# Patient Record
Sex: FEMALE | Race: WHITE
[De-identification: names, ages, dates, MRNs, and addresses within clinical notes are randomized per-mention and may not be internally consistent; named-entity substitution may affect disease eponyms.]

---

## 2018-03-12 ENCOUNTER — HOSPITAL ENCOUNTER (OUTPATIENT)
Dept: HOSPITAL 84 - D.OPS | Age: 73
Discharge: HOME | End: 2018-03-12
Attending: INTERNAL MEDICINE
Payer: MEDICARE

## 2018-03-12 VITALS
HEIGHT: 61 IN | BODY MASS INDEX: 37.84 KG/M2 | WEIGHT: 200.42 LBS | BODY MASS INDEX: 37.84 KG/M2 | HEIGHT: 61 IN | WEIGHT: 200.42 LBS

## 2018-03-12 VITALS — DIASTOLIC BLOOD PRESSURE: 95 MMHG | SYSTOLIC BLOOD PRESSURE: 139 MMHG

## 2018-03-12 DIAGNOSIS — R11.0: ICD-10-CM

## 2018-03-12 DIAGNOSIS — G47.30: ICD-10-CM

## 2018-03-12 DIAGNOSIS — R10.13: Primary | ICD-10-CM

## 2018-03-12 DIAGNOSIS — K31.7: ICD-10-CM

## 2018-03-12 DIAGNOSIS — K21.0: ICD-10-CM

## 2018-03-12 DIAGNOSIS — Z01.812: ICD-10-CM

## 2018-03-12 DIAGNOSIS — I10: ICD-10-CM

## 2018-03-12 LAB
ERYTHROCYTE [DISTWIDTH] IN BLOOD BY AUTOMATED COUNT: 16.4 % (ref 11.5–14.5)
HCT VFR BLD CALC: 37.4 % (ref 36–48)
HGB BLD-MCNC: 11 G/DL (ref 12–16)
MCH RBC QN AUTO: 24 PG (ref 26–34)
MCHC RBC AUTO-ENTMCNC: 29.4 G/DL (ref 31–37)
MCV RBC: 81.5 FL (ref 80–100)
PLATELET # BLD: 214 10X3/UL (ref 130–400)
PMV BLD AUTO: 8.9 FL (ref 7.4–10.4)
RBC # BLD AUTO: 4.59 10X6/UL (ref 4–5.4)
WBC # BLD AUTO: 8.1 10X3/UL (ref 4.8–10.8)

## 2018-03-19 ENCOUNTER — HOSPITAL ENCOUNTER (OUTPATIENT)
Dept: HOSPITAL 84 - D.NM | Age: 73
Discharge: HOME | End: 2018-03-19
Attending: INTERNAL MEDICINE
Payer: MEDICARE

## 2018-03-19 VITALS — BODY MASS INDEX: 37.8 KG/M2

## 2018-03-19 DIAGNOSIS — R11.0: ICD-10-CM

## 2018-03-19 DIAGNOSIS — R10.13: Primary | ICD-10-CM

## 2019-02-18 ENCOUNTER — HOSPITAL ENCOUNTER (OUTPATIENT)
Dept: HOSPITAL 84 - D.OPS | Age: 74
Discharge: HOME | End: 2019-02-18
Attending: INTERNAL MEDICINE
Payer: MEDICARE

## 2019-02-18 VITALS — WEIGHT: 185 LBS | BODY MASS INDEX: 34.93 KG/M2 | HEIGHT: 61 IN

## 2019-02-18 VITALS — DIASTOLIC BLOOD PRESSURE: 57 MMHG | SYSTOLIC BLOOD PRESSURE: 122 MMHG

## 2019-02-18 DIAGNOSIS — K57.30: Primary | ICD-10-CM

## 2019-02-18 DIAGNOSIS — Z01.812: ICD-10-CM

## 2019-02-18 DIAGNOSIS — D12.5: ICD-10-CM

## 2019-02-18 DIAGNOSIS — D12.4: ICD-10-CM

## 2019-02-18 DIAGNOSIS — Z85.79: ICD-10-CM

## 2019-02-18 DIAGNOSIS — D12.3: ICD-10-CM

## 2019-02-18 LAB
ERYTHROCYTE [DISTWIDTH] IN BLOOD BY AUTOMATED COUNT: 13.2 % (ref 11.5–14.5)
HCT VFR BLD CALC: 42.1 % (ref 36–48)
HGB BLD-MCNC: 13.7 G/DL (ref 12–16)
MCH RBC QN AUTO: 28.5 PG (ref 26–34)
MCHC RBC AUTO-ENTMCNC: 32.5 G/DL (ref 31–37)
MCV RBC: 87.7 FL (ref 80–100)
PLATELET # BLD: 187 10X3/UL (ref 130–400)
PMV BLD AUTO: 9.6 FL (ref 7.4–10.4)
RBC # BLD AUTO: 4.8 10X6/UL (ref 4–5.4)
WBC # BLD AUTO: 8.6 10X3/UL (ref 4.8–10.8)

## 2019-02-18 NOTE — NUR
1025-RECD FROM GI LAB. ALERT.  VOCARTER IN TO REPORT FINDINGS.
1035-FULL LIQUIDS SERVED, ONLY WANTS SODA/JUICE.

## 2019-02-20 NOTE — OP
PATIENT NAME:  SADIE VELASQUEZ                           MEDICAL RECORD: [id number]
:47                                             LOCATION:D.OPS          
                                                         ADMISSION DATE:        
SURGEON:  CHAVO HUBBARD DO             
 
 
DATE OF OPERATION:  2019
 
PROCEDURE:  Colonoscopy with polypectomy.
 
INDICATIONS FOR PROCEDURE:  History of MALT lymphoma, generalized abdominal
pain, nausea and vomiting, chronic constipation.
 
SCOPE:  Olympus video pediatric colonoscope.
 
MEDICATIONS:  Propofol 430 mg IV per anesthesia.
 
WITHDRAWAL TIME:  26 minutes.
 
ESTIMATED BLOOD LOSS:  Minimal.
 
COMPLICATIONS:  None.
 
FINDINGS:  Informed consent was given.  The patient was made comfortable with
the above medication.  After reaching an adequate level of sedation by slow IV
push, the patient was placed on her left side.  A digital rectal examination was
performed and revealed some external hemorrhoids.  The endoscope was then
advanced under direct visualization through the rectum to the cecum and terminal
ileum.  The endoscope was slowly withdrawn and the mucosa was carefully
examined.  The prep quality was fair.  There were multiple polyps visualized on
today's examination.  Three of these polyps were located in the transverse
colon.  They were benign appearing and sessile and ranged in size from 3-8 mm in
diameter.  Two of these were removed using a hot snare and the third polyp was
removed using hot forceps.  In the descending colon, there was a single benign
appearing sessile polyp, which measured approximately 4 mm in diameter.  It was
removed using hot forceps.  In the sigmoid colon, there was a single polyp,
which was benign appearing and sessile and measured approximately 8 mm in
diameter.  It was removed using a hot snare.  There was evidence of moderate
diverticulosis involving the sigmoid colon.  Retroflexion was performed in the
rectum with a normal appearing rectal wall.  The endoscope was withdrawn from
the patient.  The patient tolerated the procedure well and there were no
complications.
 
IMPRESSION:
1.  Multiple polyps as described above, removed using a combination of hot
forceps and a hot snare.
2.  Moderate diverticulosis of the sigmoid colon.
 
PLAN AND RECOMMENDATIONS:
1.  Discharge home when recovery parameters are met.
2.  Follow up biopsy specimen results.
3.  High fiber diet.
4.  Consider supplementing diet with 1 tablespoon of Metamucil daily.
5.  Continue current medications.
6.  Proceed with upper endoscopy as scheduled for generalized abdominal pain,
nausea and vomiting, and history of MALT lymphoma.
7.  We will follow up in GI clinic after upper endoscopy is completed for
further workup of symptoms.
 
 
 
OPERATIVE REPORT                               [id number]    SADIE VELASQUEZ         
 
 
8.  If bowel movements do not improve with supplementation of fiber, consider
trial of Linzess 145 mcg daily.
 
TRANSINT:[id number] Voice Confirmation ID: [id number] DOCUMENT ID: [id number]
                                           
                                           CHAVO HUBBARD DO             
 
 
 
Electronically Signed by CHAVO ALEMAN on 19 at 0941
 
 
 
 
 
 
 
 
 
 
 
 
 
 
 
 
 
 
 
 
 
 
 
 
 
 
 
 
 
 
 
 
 
 
 
 
 
CC:                                                             [id number]
DICTATION DATE: 19 1015     :     19 1026      [facility] [place] 
                                                                      19
[address] [address]

## 2019-03-04 ENCOUNTER — HOSPITAL ENCOUNTER (OUTPATIENT)
Dept: HOSPITAL 84 - D.OPS | Age: 74
Discharge: HOME | End: 2019-03-04
Attending: INTERNAL MEDICINE
Payer: MEDICARE

## 2019-03-04 VITALS — HEIGHT: 61 IN | WEIGHT: 185.39 LBS | BODY MASS INDEX: 35 KG/M2 | BODY MASS INDEX: 35 KG/M2

## 2019-03-04 DIAGNOSIS — K31.7: ICD-10-CM

## 2019-03-04 DIAGNOSIS — K21.0: ICD-10-CM

## 2019-03-04 DIAGNOSIS — K29.50: Primary | ICD-10-CM

## 2019-03-04 DIAGNOSIS — Z01.812: ICD-10-CM

## 2019-03-04 DIAGNOSIS — Z85.79: ICD-10-CM

## 2019-03-04 LAB
ANION GAP SERPL CALC-SCNC: 13.5 MMOL/L (ref 8–16)
BASOPHILS NFR BLD AUTO: 0.6 % (ref 0–2)
BUN SERPL-MCNC: 20 MG/DL (ref 7–18)
CALCIUM SERPL-MCNC: 8.9 MG/DL (ref 8.5–10.1)
CHLORIDE SERPL-SCNC: 103 MMOL/L (ref 98–107)
CO2 SERPL-SCNC: 29.8 MMOL/L (ref 21–32)
CREAT SERPL-MCNC: 0.9 MG/DL (ref 0.6–1.3)
EOSINOPHIL NFR BLD: 5.1 % (ref 0–7)
ERYTHROCYTE [DISTWIDTH] IN BLOOD BY AUTOMATED COUNT: 13.3 % (ref 11.5–14.5)
GLUCOSE SERPL-MCNC: 115 MG/DL (ref 74–106)
HCT VFR BLD CALC: 42.5 % (ref 36–48)
HGB BLD-MCNC: 13.9 G/DL (ref 12–16)
IMM GRANULOCYTES NFR BLD: 0.1 % (ref 0–5)
LYMPHOCYTES NFR BLD AUTO: 25.2 % (ref 15–50)
MCH RBC QN AUTO: 29.1 PG (ref 26–34)
MCHC RBC AUTO-ENTMCNC: 32.7 G/DL (ref 31–37)
MCV RBC: 88.9 FL (ref 80–100)
MONOCYTES NFR BLD: 6.2 % (ref 2–11)
NEUTROPHILS NFR BLD AUTO: 62.8 % (ref 40–80)
OSMOLALITY SERPL CALC.SUM OF ELEC: 286 MOSM/KG (ref 275–300)
PLATELET # BLD: 186 10X3/UL (ref 130–400)
PMV BLD AUTO: 9.3 FL (ref 7.4–10.4)
POTASSIUM SERPL-SCNC: 4.3 MMOL/L (ref 3.5–5.1)
RBC # BLD AUTO: 4.78 10X6/UL (ref 4–5.4)
SODIUM SERPL-SCNC: 142 MMOL/L (ref 136–145)
WBC # BLD AUTO: 8.9 10X3/UL (ref 4.8–10.8)

## 2019-03-06 NOTE — OP
PATIENT NAME:  SADIE VELASQUEZ                           MEDICAL RECORD: [id number]
:47                                             LOCATION:D.OPS          
                                                         ADMISSION DATE:        
SURGEON:  CHAVO HUBBARD DO             
 
 
DATE OF OPERATION:  2019
 
PROCEDURE:  EGD with biopsies.
 
INDICATIONS FOR PROCEDURE:  History of MALT lymphoma, nausea and vomiting,
generalized abdominal pain.
 
SCOPE:  Olympus video gastroscope.
 
MEDICATIONS:  Propofol 150 mg IV per anesthesia.
 
ESTIMATED BLOOD LOSS:  Minimal.
 
COMPLICATIONS:  None.
 
FINDINGS:  Informed consent was given.  The patient was made comfortable with
the above medication.  After reaching an adequate level of sedation by slow IV
push, the patient was placed on her left side.  The endoscope was advanced under
direct visualization through the mouth to the second portion of the duodenum. 
The entire esophagus appeared normal.  At the GE junction, there were minor
changes consistent with LA class A reflux-induced esophagitis.  The endoscope
was advanced beyond the GE junction into the stomach and retroflexed to view the
cardia and fundus, which appeared normal.  Throughout the entire stomach, there
were changes consistent with possible gastritis.  There was congestion of the
mucosa, erythema, and granularity.  Multiple cold forceps biopsies were taken to
submit for histopathology and to rule out the presence of H. pylori.  There were
multiple benign-appearing fundic gland type polyps present in the stomach.  A
single biopsy was taken to confirm their benign nature.  The endoscope was
advanced beyond the pylorus into the duodenum.  The duodenum appeared normal
down to the second portion.  Random cold forceps biopsies were taken to submit
for histopathology.  The endoscope was withdrawn from the patient.  The patient
tolerated the procedure well and there were no complications.
 
IMPRESSION:
1.  LA class A reflux-induced esophagitis.
2.  Gastritis characterized mostly by congestion of the stomach with some
erythema and granularity.
3.  Multiple benign-appearing gastric polyps.  Biopsies pending.
 
PLAN AND RECOMMENDATIONS:
1.  Discharge home when recovery parameters are met.
2.  Follow up biopsy specimen results.
3.  GERD diet and reflux precautions.
4.  Continue current medications.
5.  Add famotidine 40 mg tablets once daily.
6.  We will provide a trial of dicyclomine 20 mg b.i.d. p.r.n. abdominal pain.
7.  Follow up in GI clinic in 2-3 weeks.
 
TRANSINT:[id number] Voice Confirmation ID: [id number] DOCUMENT ID: [id number]
 
 
 
OPERATIVE REPORT                               [id number]    SADIE VELASQUEZ NATHAN A DO             
 
 
 
Electronically Signed by CHAVO ALEMAN on 19 at 1206
 
 
 
 
 
 
 
 
 
 
 
 
 
 
 
 
 
 
 
 
 
 
 
 
 
 
 
 
 
 
 
 
 
 
 
 
 
 
 
 
 
CC:                                                             [id number]
DICTATION DATE: 19 1154     :     19 1241      [facility] 
                                                                      19
[facility]                                          
[address] [id number]

## 2020-06-15 ENCOUNTER — HOSPITAL ENCOUNTER (OUTPATIENT)
Dept: HOSPITAL 84 - D.OPS | Age: 75
Discharge: HOME | End: 2020-06-15
Attending: INTERNAL MEDICINE
Payer: MEDICARE

## 2020-06-15 VITALS — SYSTOLIC BLOOD PRESSURE: 146 MMHG | DIASTOLIC BLOOD PRESSURE: 67 MMHG

## 2020-06-15 VITALS
BODY MASS INDEX: 35.95 KG/M2 | HEIGHT: 61 IN | WEIGHT: 190.4 LBS | WEIGHT: 190.4 LBS | BODY MASS INDEX: 35.95 KG/M2 | HEIGHT: 61 IN

## 2020-06-15 DIAGNOSIS — Z85.72: ICD-10-CM

## 2020-06-15 DIAGNOSIS — R14.0: ICD-10-CM

## 2020-06-15 DIAGNOSIS — K22.2: ICD-10-CM

## 2020-06-15 DIAGNOSIS — K21.0: ICD-10-CM

## 2020-06-15 DIAGNOSIS — R10.84: Primary | ICD-10-CM

## 2020-06-15 DIAGNOSIS — Z86.010: ICD-10-CM

## 2020-06-15 DIAGNOSIS — K21.9: ICD-10-CM

## 2020-06-15 DIAGNOSIS — K76.0: ICD-10-CM

## 2020-06-15 DIAGNOSIS — R11.0: ICD-10-CM

## 2020-06-15 DIAGNOSIS — R13.10: ICD-10-CM

## 2020-06-15 DIAGNOSIS — J45.909: ICD-10-CM

## 2020-06-15 LAB
ANION GAP SERPL CALC-SCNC: 7.5 MMOL/L (ref 8–16)
BASOPHILS NFR BLD AUTO: 0.7 % (ref 0–2)
BUN SERPL-MCNC: 19 MG/DL (ref 7–18)
CALCIUM SERPL-MCNC: 8.7 MG/DL (ref 8.5–10.1)
CHLORIDE SERPL-SCNC: 103 MMOL/L (ref 98–107)
CO2 SERPL-SCNC: 31.8 MMOL/L (ref 21–32)
CREAT SERPL-MCNC: 1 MG/DL (ref 0.6–1.3)
EOSINOPHIL NFR BLD: 5.2 % (ref 0–7)
ERYTHROCYTE [DISTWIDTH] IN BLOOD BY AUTOMATED COUNT: 13.3 % (ref 11.5–14.5)
GLUCOSE SERPL-MCNC: 100 MG/DL (ref 74–106)
HCT VFR BLD CALC: 46 % (ref 36–48)
HGB BLD-MCNC: 14.1 G/DL (ref 12–16)
IMM GRANULOCYTES NFR BLD: 0.1 % (ref 0–5)
LYMPHOCYTES NFR BLD AUTO: 36.7 % (ref 15–50)
MCH RBC QN AUTO: 28.3 PG (ref 26–34)
MCHC RBC AUTO-ENTMCNC: 30.7 G/DL (ref 31–37)
MCV RBC: 92.4 FL (ref 80–100)
MONOCYTES NFR BLD: 7.6 % (ref 2–11)
NEUTROPHILS NFR BLD AUTO: 49.7 % (ref 40–80)
OSMOLALITY SERPL CALC.SUM OF ELEC: 277 MOSM/KG (ref 275–300)
PLATELET # BLD: 190 10X3/UL (ref 130–400)
PMV BLD AUTO: 8.8 FL (ref 7.4–10.4)
POTASSIUM SERPL-SCNC: 4.3 MMOL/L (ref 3.5–5.1)
RBC # BLD AUTO: 4.98 10X6/UL (ref 4–5.4)
SODIUM SERPL-SCNC: 138 MMOL/L (ref 136–145)
WBC # BLD AUTO: 6.9 10X3/UL (ref 4.8–10.8)

## 2020-06-16 NOTE — OP
PATIENT NAME:  SADIE VELASQUEZ                           MEDICAL RECORD: [id number]
:47                                             LOCATION:D.OPS          
                                                         ADMISSION DATE:        
SURGEON:  CHAVO HUBBARD DO             
 
 
DATE OF OPERATION:  06/15/2020
 
PROCEDURE:  EGD with biopsies and balloon dilation.
 
INDICATION FOR PROCEDURE:  Generalized abdominal pain, gas and bloating, nausea,
dysphagia, GERD, history of MALT lymphoma.
 
SCOPE:  Olympus video gastroscope.
 
MEDICATIONS:  Propofol 150 mg IV per anesthesia.
 
ESTIMATED BLOOD LOSS:  Minimal.
 
COMPLICATIONS:  None.
 
FINDINGS:  Informed consent was given.  The patient was made comfortable with
the above medication.  After reaching an adequate level of sedation by slow IV
push, the patient was placed on her left side.  The endoscope was advanced under
direct visualization through the mouth to the second portion of the duodenum
with ease.  The upper, middle, and lower thirds of the esophagus appeared
normal.  Cold forceps biopsies were taken randomly from the mid esophagus to
rule out the presence of eosinophils.  At the GE junction, there were minor
changes consistent with LA class A reflux-induced esophagitis and some possible
esophageal stenosis.  An 18-20 mm CRE dilating balloon was used to dilate the
distal esophagus to 20 mm maximum diameter successfully.  The endoscope was
advanced beyond the GE junction into the stomach and retroflexed to view the
cardia which appeared normal.  The fundus also appeared normal.  In the fundus
and gastric body, there were multiple gastric polyps, which appeared benign and
appeared consistent with fundic gland polyps.  One was biopsied using cold
forceps to submit for histopathology and to confirm the benign nature. 
Throughout the body, antrum, and prepyloric regions, there were patchy areas of
erythema and granularity and friability.  Cold forceps biopsies were taken from
the antrum and incisura to submit for histopathology and to rule out the
presence of H. pylori.  The endoscope was advanced beyond the pylorus into the
duodenum which appeared normal to the second portion.  The endoscope was
withdrawn from the patient.  The patient tolerated the procedure well and there
were no complications.
 
IMPRESSION:
1.  LA class A reflux-induced esophagitis.
2.  Mild esophageal stenosis of the distal esophagus and GE junction, status
post dilation to 20 mm maximum diameter.
3.  Chronic gastritis changes.
4.  Multiple benign-appearing fundic gland type gastric polyps, status post
biopsy with cold forceps.
 
PLAN AND RECOMMENDATIONS:
1.  Discharge home when recovery parameters are met.
2.  Follow up biopsy specimen results.
3.  GERD diet and reflux precautions.
4.  Continue current medications.
5.  Pantoprazole 40 mg daily.
 
 
 
OPERATIVE REPORT                               [id number]    SADIE VELASQUEZ         
 
 
6.  Carafate suspension 1 g q.i.d.  from other medications by 2 hours.
7.  Gastric emptying scan regarding upper digestive symptoms, which sound
consistent with gastroparesis.
8.  Follow up in GI clinic in 3-4 weeks.
9.  If dysphagia persists, consider manometry study.
 
NTS:[id number] Voice Confirmation ID: [id number] DOCUMENT ID: [id number]
                                           
                                           CAHVO HUBBARD DO             
 
 
 
Electronically Signed by CHAVO ALEMAN on 20 at 0711
 
 
 
 
 
 
 
 
 
 
 
 
 
 
 
 
 
 
 
 
 
 
 
 
 
 
 
 
 
 
 
 
 
 
CC:                                                             [id number]
DICTATION DATE: 06/15/20 105     :     06/15/20 2139      [facility] 
                                                                      06/15/20
[facility]                                          
[address] [place] [address]

## 2020-08-14 LAB
AMPHETAMINES UR QL SCN: NEGATIVE QUAL
ANION GAP SERPL CALC-SCNC: 10.4 MMOL/L (ref 8–16)
APTT BLD: 47.9 SECONDS (ref 22.8–39.4)
BARBITURATES UR QL SCN: NEGATIVE QUAL
BASOPHILS NFR BLD AUTO: 0.7 % (ref 0–2)
BENZODIAZ UR QL SCN: NEGATIVE QUAL
BUN SERPL-MCNC: 14 MG/DL (ref 7–18)
BZE UR QL SCN: NEGATIVE QUAL
CALCIUM SERPL-MCNC: 9.4 MG/DL (ref 8.5–10.1)
CANNABINOIDS UR QL SCN: NEGATIVE QUAL
CHLORIDE SERPL-SCNC: 99 MMOL/L (ref 98–107)
CO2 SERPL-SCNC: 32.7 MMOL/L (ref 21–32)
CREAT SERPL-MCNC: 1 MG/DL (ref 0.6–1.3)
EOSINOPHIL NFR BLD: 5.1 % (ref 0–7)
ERYTHROCYTE [DISTWIDTH] IN BLOOD BY AUTOMATED COUNT: 13.8 % (ref 11.5–14.5)
GLUCOSE SERPL-MCNC: 108 MG/DL (ref 74–106)
HCT VFR BLD CALC: 43.9 % (ref 36–48)
HGB BLD-MCNC: 13.5 G/DL (ref 12–16)
IMM GRANULOCYTES NFR BLD: 0 % (ref 0–5)
INR PPP: 1.55 (ref 0.85–1.17)
LYMPHOCYTES NFR BLD AUTO: 37.1 % (ref 15–50)
MCH RBC QN AUTO: 28.3 PG (ref 26–34)
MCHC RBC AUTO-ENTMCNC: 30.8 G/DL (ref 31–37)
MCV RBC: 92 FL (ref 80–100)
MONOCYTES NFR BLD: 8 % (ref 2–11)
NEUTROPHILS NFR BLD AUTO: 49.1 % (ref 40–80)
OPIATES UR QL SCN: NEGATIVE QUAL
OSMOLALITY SERPL CALC.SUM OF ELEC: 277 MOSM/KG (ref 275–300)
PCP UR QL SCN: NEGATIVE QUAL
PLATELET # BLD: 191 10X3/UL (ref 130–400)
PMV BLD AUTO: 8.8 FL (ref 7.4–10.4)
POTASSIUM SERPL-SCNC: 4.1 MMOL/L (ref 3.5–5.1)
PROTHROMBIN TIME: 18.4 SECONDS (ref 11.6–15)
RBC # BLD AUTO: 4.77 10X6/UL (ref 4–5.4)
SODIUM SERPL-SCNC: 138 MMOL/L (ref 136–145)
WBC # BLD AUTO: 5.7 10X3/UL (ref 4.8–10.8)

## 2020-08-16 NOTE — NUR
REC'D PT TO RM 1273 VIA W/C PER ED NURSE TO AWAIT ADMISSION AFTER MIDNIGHT PER
DR CAO ORDERS. ADMISSION CALLS UNIT WANTING TO KNOW WHAT STATUS TO PLACE
PAITENT IN. PT TO NOT BE ADMITTED UNTIL AFTER MIDNIGHT AND HAS A PREIN NUMBER.
THIS RN NOTICES PT HAS BEEN FULLY ADMITTED W/AN ADMISSION NUMBER AND CALLS
ER ADMISSIONS TO INFORM THEM THAT PT HAS A PREIN NUMBER. MEDICAL RECORDS WILL
HAVE TO MERGE CHARTS. PT'S INSURANCE WILL NOT COVER UNTIL DAY OF SURGERY.

## 2020-08-17 ENCOUNTER — HOSPITAL ENCOUNTER (INPATIENT)
Dept: HOSPITAL 84 - D.LD | Age: 75
LOS: 4 days | Discharge: NURSING, REHAB OR LONG-TERM CARE | DRG: 746 | End: 2020-08-21
Attending: OBSTETRICS & GYNECOLOGY | Admitting: OBSTETRICS & GYNECOLOGY
Payer: MEDICARE

## 2020-08-17 VITALS
BODY MASS INDEX: 43.43 KG/M2 | HEIGHT: 61 IN | BODY MASS INDEX: 43.43 KG/M2 | BODY MASS INDEX: 43.43 KG/M2 | WEIGHT: 230 LBS | BODY MASS INDEX: 43.43 KG/M2 | HEIGHT: 61 IN | WEIGHT: 230 LBS

## 2020-08-17 VITALS — DIASTOLIC BLOOD PRESSURE: 56 MMHG | SYSTOLIC BLOOD PRESSURE: 116 MMHG

## 2020-08-17 VITALS — SYSTOLIC BLOOD PRESSURE: 118 MMHG | DIASTOLIC BLOOD PRESSURE: 58 MMHG

## 2020-08-17 VITALS — DIASTOLIC BLOOD PRESSURE: 53 MMHG | SYSTOLIC BLOOD PRESSURE: 109 MMHG

## 2020-08-17 VITALS — DIASTOLIC BLOOD PRESSURE: 53 MMHG | SYSTOLIC BLOOD PRESSURE: 108 MMHG

## 2020-08-17 VITALS — DIASTOLIC BLOOD PRESSURE: 54 MMHG | SYSTOLIC BLOOD PRESSURE: 125 MMHG

## 2020-08-17 VITALS — SYSTOLIC BLOOD PRESSURE: 153 MMHG | DIASTOLIC BLOOD PRESSURE: 68 MMHG

## 2020-08-17 VITALS — SYSTOLIC BLOOD PRESSURE: 148 MMHG | DIASTOLIC BLOOD PRESSURE: 63 MMHG

## 2020-08-17 DIAGNOSIS — K76.0: ICD-10-CM

## 2020-08-17 DIAGNOSIS — J96.01: ICD-10-CM

## 2020-08-17 DIAGNOSIS — K31.84: ICD-10-CM

## 2020-08-17 DIAGNOSIS — F31.30: ICD-10-CM

## 2020-08-17 DIAGNOSIS — E66.01: ICD-10-CM

## 2020-08-17 DIAGNOSIS — J45.901: ICD-10-CM

## 2020-08-17 DIAGNOSIS — G62.9: ICD-10-CM

## 2020-08-17 DIAGNOSIS — J96.02: ICD-10-CM

## 2020-08-17 DIAGNOSIS — M81.0: ICD-10-CM

## 2020-08-17 DIAGNOSIS — I50.33: ICD-10-CM

## 2020-08-17 DIAGNOSIS — Z87.891: ICD-10-CM

## 2020-08-17 DIAGNOSIS — G47.33: ICD-10-CM

## 2020-08-17 DIAGNOSIS — Z86.73: ICD-10-CM

## 2020-08-17 DIAGNOSIS — F41.8: ICD-10-CM

## 2020-08-17 DIAGNOSIS — J44.0: ICD-10-CM

## 2020-08-17 DIAGNOSIS — K21.9: ICD-10-CM

## 2020-08-17 DIAGNOSIS — N81.5: Primary | ICD-10-CM

## 2020-08-17 DIAGNOSIS — J18.9: ICD-10-CM

## 2020-08-17 DIAGNOSIS — I11.0: ICD-10-CM

## 2020-08-17 DIAGNOSIS — N81.6: ICD-10-CM

## 2020-08-17 PROCEDURE — 0JUC07Z SUPPLEMENT OF PELVIC REGION SUBCUTANEOUS TISSUE AND FASCIA WITH AUTOLOGOUS TISSUE SUBSTITUTE, OPEN APPROACH: ICD-10-PCS | Performed by: OBSTETRICS & GYNECOLOGY

## 2020-08-17 PROCEDURE — 0UUF07Z SUPPLEMENT CUL-DE-SAC WITH AUTOLOGOUS TISSUE SUBSTITUTE, OPEN APPROACH: ICD-10-PCS | Performed by: OBSTETRICS & GYNECOLOGY

## 2020-08-17 NOTE — NUR
ROUNDS MADE. PT CONTINUES TO BE DROWZY. ABLE TO WAKE EASILY, BUT DRIFTS OFF TO
SLEEP WHILE RN SPEAKING TO HER. ATTEMPTS TO ASSESS PAIN, PT UNABLE TO REMAIN
AWAKE TO DESCRIBE OR C/O PAIN. NEW BAG OF LR UP TO INFUSE AT 125ML/HR. URINE
OUTPUT NOTED AS 125ML. LIGHT BLUE URINE NOTED.

## 2020-08-17 NOTE — NUR
ROUNDS MADE. PT RESTING TO RT SIDE W/EYES CLOSED. HOB ELEVATED. RESP EVEN AND
UNLABORED. PT LEFT UNDISTURBED AT THIS TIME TO ALLOW FOR REST.

## 2020-08-17 NOTE — NUR
PT MORE AWAKE AND ASKING FOR PAIN MED RATES PAIN AT 8/10 AND STATES IT IS
CONSTANT. PERCOCET 5/325MG GIVEN WITH UNDERSTANDING PAIN WILL BE REASSESSED IN
AN HOUR AND IF NEEDED ADDITIONAL PERCOCET COULD BE GIVEN.  PT STATED THAT SHE
WOULD RATHER NOT HAVE DILAUDID DUE TO WAY IT MAKES HER FEEL.  LARGE CUP OF ICE
AS REQUESTED.

## 2020-08-17 NOTE — NUR
ROUNDS MADE. PT REMAINS IN HIGH RIBERA'S W/EYES CLOSED. RESP EVEN. NC REMAINS
IN PLACE. PULSE OX REMAINS ON PT'S FINGER. NO DISTRESS NOTED. PT LEFT
UNDISTURBED TO ALLOW FOR REST.

## 2020-08-17 NOTE — NUR
PT CONTINUE TO RATE PAIN AT 6/10, 2ND PERCOCET 5/325MG GIVEN PO.  SHE STATES
UNDERSTANDING PAIN MED CAN NOT BE GIVEN UNTIL 4 HOURS FROM TIME OF 2ND DOSE.
MOVED UP WITH ASSISTANCE AND TURNED TO HER RIGHT SIDE.  LARGE ICE WATER AS
REQUESTED. SIDE RAILS UP X 2 WITH PHONE AND CALL LIGHT IN REACH.

## 2020-08-17 NOTE — NUR
PT RESTING WITH EYES CLOSED AND RESP UNLABORED,  O2 SAT 95-98% AT THIS TIME.
OXYGEN LEVEL DECREASED TO 4L VIA NC. CALL LIGHT IN REACH.  100ML BLUE URINE
NOTED TO COLLECTION CANISTER.

## 2020-08-17 NOTE — NUR
UNDERPAD CHANGED AND PT MOVED UP IN BED WITH ASSISTANCE FROM CARRIE REDDING RN.
PT ABLE TO TURN HERSELF TO LEFT LATERAL.  IV RATE INCREASED TO 999ML/HR.  SIDE
RAILS UP X 2 WITH CALL LIGHT IN REACH. PT STILL VERY DROWSY BUT ABLE TO FOLLOW
COMMANDS, O2 VIA NC REMAINS AT 6L WITH SAT OF 92-94%,  resp therepy has been
consulted.

## 2020-08-17 NOTE — OP
PATIENT NAME:  SADIE VELASQUEZ                           MEDICAL RECORD: [id number]
:47                                             LOCATION:[facility]     D.1273
                                                         ADMISSION DATE:20
SURGEON:  JAYCE CAO MD          
 
 
DATE OF OPERATION:  2020
 
PREOPERATIVE DIAGNOSES:
1.  Rectocele.
2.  Enterocele.
 
POSTOPERATIVE DIAGNOSES:
1.  Rectocele.
2.  Enterocele.
 
PROCEDURES PERFORMED:
1.  Enterocele repair.
2.  Posterior colporrhaphy with fascial graft placement.
 
SURGEON:  JAYCE CAO MD
 
ANESTHESIOLOGIST:  Maldonado Basilio MD
 
ANESTHETIC:  General.
 
FINDINGS:  Anterior enterocele present.  Rectocele present with prolapse of the
posterior vault to the introitus.  Unremarkable bladder mucosa and good efflux
of urine from both ureteral orifices.
 
SPECIMENS REMOVED:  None applicable.
 
SPECIMEN DISPOSITION:  None applicable.
 
ESTIMATED BLOOD LOSS:  150 mL.
 
FLUIDS:  1100 mL of lactated Ringer's.
 
URINE OUTPUT:  Quantity sufficient.
 
COMPLICATIONS:  None.
 
DRAIN:  Gustafson to gravity with vaginal packing.
 
INDICATION:  The patient is a 74-year-old female with pelvic prolapse and
splinting.  The patient states that there is an uncomfortable bulge consistently
at her introitus.  The patient has been given options of attempted pessary
versus surgical intervention and the patient desires surgery.  Risks, benefits,
and limitations have been described.
 
DESCRIPTION OF PROCEDURE:  After informed consent is assured, the patient is
taken to the operating room where anesthetic is obtained.  The patient is now
prepped and draped and placed in stirrups.  The posterior defect and enterocele
are examined.  The space is injected with 0.5% lidocaine solution with
epinephrine.  After this full-thickness injection of approximately 20 mL of
diluted lidocaine with epinephrine is placed, an incision is made with 15 blade
across the posterior fourchette.  The mucosa of the posterior vault is
undermined with Metzenbaum scissors.  This is carried out all the way to the
 
 
 
OPERATIVE REPORT                               [id number]    SADIE VELASQUEZ         
 
 
apex of the defect at the top of the vagina.  The mucosa is now dissected free
both right and left sides with sharp dissection as well as with use of neuro
patties.  Once the full-thickness dissection is completed, the enterocele sac is
identified and entered sharply.  The bowel is packed away with a laparotomy
sponge and both the right and left uterosacral ligaments are identified. 
Uterosacral ligaments are now plicated with Ethibond stitch and a stitch is
carried through the vaginal mucosa in this complex that has been created.  The
hernia sac is now reduced and closed with 2 pursestring stitches.  Attention is
now directed posteriorly.  A large defect is noted with diminished tissue for
repair.  At the proximal segment of the vagina, a layer of endopelvic fascia is
pulled together and horizontal mattress stitches are applied to repair the
defect that is seen here.  The superior defect is repaired using a fascial
graft.  The vaginal graft is reconstituted after being cut with 2 arms that will
be placed over the sacrospinous ligaments.  Sacrospinous ligaments are located
and Capio SLIM suture applier is used to place a Prolene stitch through each
ligament on both right and left sides.  The stitch is brought through the arms
of the fascial graft and tied into place.  The fascial graft is now cut to fit
to the posterior vault and interrupted Vicryl stitches are used to place this to
the endopelvic fascia.  Mucosa is trimmed and closed over this.  A V-shaped
incision is made over the perineal body and this tissue is removed.  The skin
here is closed with a subcuticular stitch and secured into the vaginal vault. 
Cystoscopy is now performed.  The aforementioned stitch that was brought through
the uterosacral ligament complex through the vagina is tied, securing the apex
to the vaginal support.  Cystoscopy is now performed.  Mucosa is intact and good
flow of urine seen both from right and left ureters.  The cystoscopy is
discontinued, Gustafson catheter started, and vaginal packing placed.  Sponge, lap,
needle counts correct times 2.  The patient is awakened and went to the recovery
area in stable condition.
 
NTS:[id number] Voice Confirmation ID: [id number] DOCUMENT ID: [id number]
                                           
                                           JAYCE CAO MD          
 
 
 
 
 
 
 
 
 
 
 
 
 
 
 
CC:                                                             [id number]
DICTATION DATE: 20 1352     :     20      [facility] IN  
                                                                      20
[facility]                                          
[address] [place] [address]

## 2020-08-17 NOTE — NUR
ROUNDS MADE FOR SHIFT ASSESSMENT. PT CURRENTLY SLEEPING W/HOB ELEVATED. NC
REMAINS IN PLACE. RATE INCREASED TO 6L TO MAINTAIN O2 SAT OF 95%. RESP EVEN
AND UNLABORED. PT LEFT UNDISTURBED AT THIS TIME.

## 2020-08-17 NOTE — NUR
ROUNDS MADE. PT NOW RESTING IN LOW RIBERA'S POSITION W/EYES CLOSED. RESP EVEN
AND UNLABORED. PT LEFT UNDISTURBED.

## 2020-08-17 NOTE — NUR
RECEIVED BY BED FROM RECOVERY WITH BEDSIDE REPORT FROM VANESSA PACHECO RN.  PT IS
STILL DROWSY BUT DOES RESPOND TO VERBAL COMMAND.  O2 SAT 94% WITH PT CURRENTLY
ON 6L O2 VIA NC, PER RECOVERY ROOM NURSE RESP THERAPY IS AWARE OF THIS DUE TO
PT RECIEVING UPDRAFT PRIOR TO COMING DOWN.  IV INFUSING TO L HAND PER ORDERS.
SANTAMARIA CATH TO BEDSIDE DRAIN WITH 50ML DARK BLUE URINE NOTED. SCD BILAT PER
ORDERS AND PUMP TURNED ON .  PER PT REQUEST TILTED TO LEFT SIDE,  NOTED VAG
PACKING IN PLACE,  SMALL AMOUNT BLEEDING NOTED TO UNDERPAD WHICH IS CHANGED
EASILY. ABDOMEN SOFT TO TOUCH WITH BOWEL SOUNDS PRESENT BUT NOTED TO BE
DECREASED IN BOTH LOWER QUAD.  CALL LIGHT IN REACH WITH SIDE RAILS UP X 2.

## 2020-08-17 NOTE — NUR
PT RESTING WITH EYES CLOSED,  O2 SAT 96% WHILE SLEEPING.  NO DISTRESS NOTED,
CALL LIGHT IN REACH WITH SIDE RAILS UP X 2.

## 2020-08-17 NOTE — NUR
ADMISSIONS CONTINUES TO GET PT'S ADMISSONS STATUS CORRECTED. ADMISSION
COMPLETED IN INCORRECT PT #. PT LYING AWAKE IN BED. ASSISTED W/GETTING WIFI
CONNECTED ON HER PHONE. PT REMINDED THAT SHE IS TO BE NPO. PT AGREEABLE. FALL
PRECAUTIONS THAT PT IS AGREEABLE PUT INTO PLACE. YELLOW STAR PLACED ON DOOR.
YELLOW GOWN PROVIDED, YELLOW ID BRACLET PLACED ON PT. NON SKID SOCKS PROVIDED
(PT HAS HER OWN NON SKID HOUSE SHOES THAT SHE PREFERS), BED IN LOW POSITION,
SIDE RAILS UP X 2. CALL LIGHT AND PHONE AT PT'S BEDSIDE. CLEAR PATH TO BR. PT
DENIES NEEDING ASSISTANCE W/GETTING OOB, BUT OFFER MADE TO RING CALL LIGHT IF
SHE FEELS THE NEED FOR ASSISTANCE WHEN GETTING OOB. PT AGREEABLE. PT REFUSES
BED ALARM. SIGNS RELEASE OF RESPONSIBILITY. DENIES NEEDS OTHER THAN HER
SLEEPING MEDICATION. SLEEP MED TRAZODONE 50MG PO GIVEN (SCANNED UNDER
[id number]). Met with pt, pt states plans are for d/c home today.  Pt comfortable with plans for d/c.  Discussed in OFT's, [facility] and EP will follow up. No other needs currently identified.  Will fax d/c paperwork to pt's PCP, Dr. Nunez at [facility], as pt has a follow up appointment on Monday 9/17.

## 2020-08-17 NOTE — NUR
RN TO BEDSIDE TO PERFORM SHIFT ASSESSMENT. PT WAKENED FOR ASSESSMENT AND PAIN
ASSESSMENT. PT REPORTS RT SHOULDER PAIN. OFFER MADE TO ASSIST W/REPOSITIONING.
PT AGREEABLE. SEE FLOWSHEET FOR DOCUMENTATION. PT ASSISTED W/REPOSITIONING TO
LEFT LATERAL TILT. PILLOWS PROVIDED FOR PT'S COMFORT. PT REPORTS SHE FEELS
PAIN, BUT UNABLE TO GIVE A PAIN SCORE BEFORE DOZING BACK OFF. SCHEDULED PO
MEDS GIVEN. SEE EMAR. BED IN LOW POSITION, SIDE RAILS UP X 2, BEDSIDE TABLE
MOVED TO LEFT SIDE OF BED FOR PT'S EASY REACH. NS REMAINS ON PT. RATE
DECREASED TO 4L TO MAINTAIN O2 SAT.

## 2020-08-17 NOTE — NUR
ROUNDS MADE. PT CONTINUES TO SLEEP IN HIGH RIBERA'S. NO DISTRESS NOTED. PT
LEFT UNDISTURBED AT THIS TIME.

## 2020-08-17 NOTE — NUR
DR CAO CALLED TO ASK IF HOME MEDS NEED TO BE RESTARTED. MD GIVES ORDERS
TO RESTART HOME MEDS THAT PT REPORTS SHE TAKES.

## 2020-08-17 NOTE — NUR
FLUID BOLUS COMPLETED, 150ML BLUE URINE NOTED TO COLLECTION CANISTER OF SANTAMARIA.
PT ABLE TO TAKE MEDS AS SCANNED TO EMAR, ALSO TOLERATES WATER AND GIVEN LEMON
LIME SODA AS REQUESTED. REMAINS ON HER LEFT SIDE AS REQUESTED, SIDE RAILS UP X
2 WITH CALL LIGHT IN REACH.

## 2020-08-17 NOTE — NUR
DR CAO ON UNIT QUESTIONS URINE OUTPUT, REPORT GIVEN THAT 50ML WAS NOTED
TO COLLECTION CANISTER.  ORDERS RECEIVED TO HOLD TORDOL UNTIL INCREASE OUTPUT
NOTED AND GIVE 1000ML LR BOLUS.  ALSO REPORTED PT 02SAT AND THAT SHE DID NOT
BRING HER C-PAP, CONSULT RESP THEREPY PER MD.

## 2020-08-17 NOTE — NUR
SHADI FRANCIS RN TO BEDSIDE TO ASSIST PT OOB UP TO BR. PT ABLE TO VOID. RETURNS TO
BED. LR STARTED TO LEFT PIV AT 50ML, IV PREOP MEDS GIVEN. SEE EMAR. PT
CURRENTLY DENIES PAIN. SURGERY PREOP CHECKLIST COMPLETED. SEE PROCESS
INTERVENTIONS. PT GOWNED IN YELLOW GOWN. REFUSES NONSKID BLUE SOCKS. BED
REMAINS IN LOW POSITION. SIDE RAILS UP X 2. CALL LIGHT AND PHONE AT PT'S
SIDE./

## 2020-08-18 VITALS — DIASTOLIC BLOOD PRESSURE: 59 MMHG | SYSTOLIC BLOOD PRESSURE: 117 MMHG

## 2020-08-18 VITALS — DIASTOLIC BLOOD PRESSURE: 57 MMHG | SYSTOLIC BLOOD PRESSURE: 117 MMHG

## 2020-08-18 VITALS — DIASTOLIC BLOOD PRESSURE: 58 MMHG | SYSTOLIC BLOOD PRESSURE: 123 MMHG

## 2020-08-18 VITALS — DIASTOLIC BLOOD PRESSURE: 58 MMHG | SYSTOLIC BLOOD PRESSURE: 122 MMHG

## 2020-08-18 VITALS — SYSTOLIC BLOOD PRESSURE: 105 MMHG | DIASTOLIC BLOOD PRESSURE: 51 MMHG

## 2020-08-18 NOTE — NUR
SL FLUSHES EASILY WITH 10 ML NS. SITE RETAPED AND SECURED. ROCEPHIN 1 GRAM IVP
UP AT THIS TIME. SITE CLEAR. PT INSTRUCTED ON MED. VERBALIZES UNDERSTANDING.

## 2020-08-18 NOTE — NUR
PT C/O H/A AND PAIN TO PERINEUM OF "7" ON 0-10 PAIN SCALE. PERCOCET 5/325 2
TABS GIVEN PO AS ORDERED. PT INSTRUCTED ON MED. VERBALIZES UNDERSTANDING.

## 2020-08-18 NOTE — NUR
PATIENT AMBULATED TO BATHROOM WITH ASSISTANCE. VOIDED 130ML BLUE TINGED URINE
WITHOUT DIFFICULTY. CLEAN PERIPAD PLACED AND BACK TO BED. REPOSITIONED TO LEFT
SIDE, PILLOWS PLACED FOR COMFORT. ICE, ICE WATER, JELLO AND COKE PROVIDED. SCD
BOOTS PLACED TO BILATERAL LOWER EXTREMITIES WITH WORKING MACHINE. OXYGEN
REMAINS IN PLACE AT 8L/MIN VIA HIGH FLOW NASAL CANNULA.

## 2020-08-18 NOTE — NUR
ROUNDS MADE. PT AWAKE AND ALERT. PAIN AND NEEDS ASSESSED. PT DENIES PAIN.
REQUEST FRESH COLA. FRESH ICE SERVED AND PT'S COLA SERVED. NC AT 4L NEEDED TO
MAINTAIN O2 SAT WHILE PT AWAKE AND TALKING. APPROX 250ML URINE NOTED IN
UROMETER. PT ABLE TO HOLD CONVERSATION FOR SEVERAL MINUTES WITH RN.

## 2020-08-18 NOTE — NUR
RN TO BEDSIDE FOR ROUNDING,VITALS AND I&O. PT WAKENED W/MODERATE STIMULUS.
BECOMES AWAKE AND ALERT ENOUGH TO RELAY DISCOMFORT, BUT DOES NOT REQUEST PAIN
MEDICATION. PT QUESTIONED IF SHE'D LIKE TO REPOSITION. PT DOES. THIS RN AND YANET OROZCO RN ASSIST PT WIH REPOSITIONING TO RT SIDE. PILLOWS PROVIDED FOR
COMFORT. ICE CHIPS SERVED TO PT. BED REMAINS IN LOW POSITION. SIDE RAILS UP X
2 CALL LIGHT AT SIDE. BEDSIDE TABLE ON RT SIDE OF PT FOR EASY REACH. NO
FURTHER NEEDS VOICED.

## 2020-08-18 NOTE — NUR
PT CONSUMES BISCUIT AND GALEANO. TOLERATES WELL. VAGINAL PACKING-1 KURLIX
REMOVED WITH BROWN AND RED DISCHARGE NOTED. SANTAMARIA DC'D WITH 190 ML OF CLEAR,
GREEN URINE NOTED IN BAG. PT DENIES URGE TO VOID AT THIS TIME.

## 2020-08-18 NOTE — NUR
PATIENT ASSISTED UP TO BATHROOM, VOIDED 30ML CLEAR URINE. PAD NOTED TO HAVE
TWO SMALL QUARTER SIZED SPOTS. CLEAN PAD PLACED AND PATIENT BACK TO BED. SCD
BOOTS ON WITH WORKING MACHINE, HOB ELEVATED 45DEGREES, O2 AT 8L/MIN VIA NASAL
CANNULA. SPUTUM CULTURE COLLECTED AND SENT TO LAB.

## 2020-08-18 NOTE — NUR
RECEIVED PT LYING TO LEFT SIDE IN BED. AWAKE. AAO X 3. VSS. HRRR WITHOUT
AUDIBLE MURMUR. BBS CLEAR. BS HYPOACTIVE TO LOWER QUADS. ABDOMEN
SOFT/NON-DISTNEDED. VAG PACKING NOTED WITH BRIGHT RED BLOOD NOTED TO EXPOSED
PORTION. SCANT AMT OF BRIGHT RED BLOOD NOTED TO TOWEL UNDER PATIENT. NEG
HOMANS' SIGN. PPP. NO EDEMA NOTED TO BLE. SCDS ON BLE. PUMP ON. SANTAMARIA TO
GRAVITY DRAINING LIGHT GREEN URINE-CLEAR. PIV SITE CLEAR TO LEFT HAND. LR
INFUSING  ML/HR. PT C/O PRESSURE TO RECTUM AND VAGINAL AREAS. STATES "IT
HURTS WHEN I COUGH".  STATES "I'VE HAD A COUGH FOR A LONG TIME".  DENIES
SHORTNESS OF BREATH OR PRODUCTIVE COUGH. BBS NOTED TO BE CLEAR AT THIS TIME.
PT REPOSITIONS TO SEMI-RIBERA'S POSITION FOR CLEAR LIQUID DIET. DENIES NAUSEA.
REQUESTS AND RECEIVES JELLO. PT MOVES WELL IN BED PER SELF. SR UP X 2. CALL
LIGHT IN REACH. YELLOW GOWN, ARMBAND AND STAR IN PLACE.

## 2020-08-18 NOTE — NUR
PT SITTING UP IN BED. WAKES UPON VERBAL STIMULATION. C/O H/A AND PAIN/PRESSURE
TO RECTAL AREA OF "8" ON 0-10 PAIN SCALE. TORADOL 10 MG GIVEN PO AS ORDERED.
PT INSTRUCTED ON MED. VERBALIZES UNDERSTANDING.

## 2020-08-18 NOTE — NUR
BREATH SOUNDS CLEAR EXCEPT TO RIGHT LOWER QUAD WITH EXPIRATORY CRACKLES. PT
HAS NON-PRODUCTIVE COUGH. DENIES SHORTNESS OF BREATH.

## 2020-08-18 NOTE — NUR
DR NEAL CALLED BACK, INFORMED OF LOW OXYGEN SATURATION ALL DAY. ORDERS NOTED
TO TITRATE TO 92% WITHHIGH FLOW NASAL CANNULA.

## 2020-08-18 NOTE — NUR
DR CAO CALLS. PT STATUS UPDATE GIVEN, INCLUDING O2 SAT'S AND PT ON 4
LITERS O2 PER N/C. ORDERS RECEIVED.

## 2020-08-18 NOTE — NUR
PT REPOSITIONED W/MINIMAL ASSISTANCE TO LEFT SIDE. PINK PAD/CHUX CHANGED.
SANTAMARIA EMPTIED W/APPROX 900ML NOTED. IV PUMP CLEARED.

## 2020-08-18 NOTE — NUR
PT OOB AND AMB TO BR X 2 ASSISTS. PT WITH UNSTEADY GAIT. PT VOIDS SMALL,
UNMEASURED AMOUNT OF BLOOD-TINGED URINEE-PT MISSED SPECIPAN. PERICARE DONE.
PAD CHANGED WITH SMALL AMT OF SEROSANGUINOUS DISCHARGE NOTED. BED LINENS
CHANGED. PT AMB BACK TO BED X 2 ASSISTS. MICHAEL ACTIVITY WELL. SRUP X 2. CALL
LIGHT IN REACH. SCDS ON BLE. PUMP ON.

## 2020-08-18 NOTE — NUR
RN TO BEDSIDE FOR ROUNDING. PT AA&O. PT NOW C/O KNEE PAIN AND REPORTS SHE
DOESN'T FEEL LIKE SHE CAN MOVE IT. PAIN MEDICATION OFFERED. PT ACCEPTS. RATES
KNEE PAIN 9/10. PERCOCET 5MG PO GIVEN AND 2 CUPS JEELO SERVED. VITAL SIGNS
OBTAINED. SEE FLOWSHEET. NEW BAG OF LR TO ROOM TO ADMIN AFTER LAST 100ML IN
PRESENT BAG COMPLETE. PT DECLINES REPOSITIONING AT THIS TIME UNTIL PAIN
MEDICATION BEGINS TO HELP HER KNEE PAIN. P DENIES FURTHER NEEDS.

## 2020-08-18 NOTE — NUR
PATIENT SITTING UP IN BED WATCHING TV. NASAL CANNULA NOTED TO BE AROUND HER
NECK. NASAL CANNULA REPLACED AT THIS TIME.

## 2020-08-18 NOTE — NUR
PT STATES DESIRE TO STAND. PT STANDS WITH ASSISTANCE. PT AMBULATES TO BR WITH
2 ASSISTS. PT GAIT UNSTEADY AT TIMES. THIS NURSE AND NOREEN YOO WITH PT.

## 2020-08-18 NOTE — NUR
IN/OUT CATH PERFORMED USING ASEPTIC TECHNIQUE. 350 ML OF CLEAR, GREEN URINE
NOTED IN BAG. PT MICHAEL WELL.  PERICARE DONE. PERIPAD CHANGED WITH SMALL AMT OF
SEROSANGUINOUS DRAINAGE NOTED. PANTIES ON. PT REPOSITIONS TO SEMI-RIBERA'S
POSITION IN BED. PT PULLS 800 ON INCENTIVE SPIROMETER. PT COUGHS WITH NO
SPUTUM COLLECTED.

## 2020-08-18 NOTE — NUR
PT UNABLE TO VOID. PANTIES AND PAD ON. SMEAR OF OLD BLOOD NOTED ON PREVIOUS
PAD. PT AMBULATES, UNSTEADY GAIT AT TIMES, X 2 ASSISTS BACK TO BED. O2 BACK ON
AT 4 LITERS PER N/C. SCDS ON BLE. PUMP ON. PT MICHAEL ACTIVITY WELL.

## 2020-08-18 NOTE — NUR
pt's iv pump sounding. this rn to bedside for assessment. pt remains to rt
side. sleeping, but stirs with movement in the room. pt's hand repositioned.
valencia cath noted to have approx 60ml in urometer.

## 2020-08-19 ENCOUNTER — HOSPITAL ENCOUNTER (INPATIENT)
Dept: HOSPITAL 84 - D.REHAB | Age: 75
LOS: 8 days | Discharge: HOME HEALTH | DRG: 947 | End: 2020-08-27
Attending: FAMILY MEDICINE | Admitting: FAMILY MEDICINE
Payer: MEDICARE

## 2020-08-19 VITALS — SYSTOLIC BLOOD PRESSURE: 111 MMHG | DIASTOLIC BLOOD PRESSURE: 56 MMHG

## 2020-08-19 VITALS — DIASTOLIC BLOOD PRESSURE: 58 MMHG | SYSTOLIC BLOOD PRESSURE: 119 MMHG

## 2020-08-19 VITALS — BODY MASS INDEX: 35.87 KG/M2 | WEIGHT: 190 LBS | BODY MASS INDEX: 35.87 KG/M2 | HEIGHT: 61 IN

## 2020-08-19 VITALS — SYSTOLIC BLOOD PRESSURE: 121 MMHG | DIASTOLIC BLOOD PRESSURE: 56 MMHG

## 2020-08-19 DIAGNOSIS — G47.33: ICD-10-CM

## 2020-08-19 DIAGNOSIS — J96.01: ICD-10-CM

## 2020-08-19 DIAGNOSIS — M81.0: ICD-10-CM

## 2020-08-19 DIAGNOSIS — J96.02: ICD-10-CM

## 2020-08-19 DIAGNOSIS — F31.9: ICD-10-CM

## 2020-08-19 DIAGNOSIS — R53.1: ICD-10-CM

## 2020-08-19 DIAGNOSIS — E66.01: ICD-10-CM

## 2020-08-19 DIAGNOSIS — K21.9: ICD-10-CM

## 2020-08-19 DIAGNOSIS — R53.81: Primary | ICD-10-CM

## 2020-08-19 DIAGNOSIS — K76.0: ICD-10-CM

## 2020-08-19 DIAGNOSIS — J42: ICD-10-CM

## 2020-08-19 DIAGNOSIS — K46.9: ICD-10-CM

## 2020-08-19 DIAGNOSIS — N81.10: ICD-10-CM

## 2020-08-19 DIAGNOSIS — J30.9: ICD-10-CM

## 2020-08-19 DIAGNOSIS — I10: ICD-10-CM

## 2020-08-19 DIAGNOSIS — G62.9: ICD-10-CM

## 2020-08-19 LAB
ANION GAP SERPL CALC-SCNC: 7.8 MMOL/L (ref 8–16)
BASOPHILS NFR BLD AUTO: 0 % (ref 0–2)
BUN SERPL-MCNC: 12 MG/DL (ref 7–18)
CALCIUM SERPL-MCNC: 8 MG/DL (ref 8.5–10.1)
CHLORIDE SERPL-SCNC: 101 MMOL/L (ref 98–107)
CO2 SERPL-SCNC: 32.4 MMOL/L (ref 21–32)
CREAT SERPL-MCNC: 0.9 MG/DL (ref 0.6–1.3)
EOSINOPHIL NFR BLD: 0.1 % (ref 0–7)
ERYTHROCYTE [DISTWIDTH] IN BLOOD BY AUTOMATED COUNT: 13.8 % (ref 11.5–14.5)
GLUCOSE SERPL-MCNC: 143 MG/DL (ref 74–106)
HCT VFR BLD CALC: 35.6 % (ref 36–48)
HGB BLD-MCNC: 10.5 G/DL (ref 12–16)
IMM GRANULOCYTES NFR BLD: 0.2 % (ref 0–5)
LYMPHOCYTES NFR BLD AUTO: 11.6 % (ref 15–50)
MAGNESIUM SERPL-MCNC: 2 MG/DL (ref 1.8–2.4)
MCH RBC QN AUTO: 28 PG (ref 26–34)
MCHC RBC AUTO-ENTMCNC: 29.5 G/DL (ref 31–37)
MCV RBC: 94.9 FL (ref 80–100)
MONOCYTES NFR BLD: 3.6 % (ref 2–11)
NEUTROPHILS NFR BLD AUTO: 84.5 % (ref 40–80)
NT-PROBNP SERPL-MCNC: 1887 PG/ML (ref 0–125)
OSMOLALITY SERPL CALC.SUM OF ELEC: 275 MOSM/KG (ref 275–300)
PHOSPHATE SERPL-MCNC: 2.6 MG/DL (ref 2.5–4.9)
PLATELET # BLD: 141 10X3/UL (ref 130–400)
PMV BLD AUTO: 9.4 FL (ref 7.4–10.4)
POTASSIUM SERPL-SCNC: 4.2 MMOL/L (ref 3.5–5.1)
RBC # BLD AUTO: 3.75 10X6/UL (ref 4–5.4)
SODIUM SERPL-SCNC: 137 MMOL/L (ref 136–145)
WBC # BLD AUTO: 9.3 10X3/UL (ref 4.8–10.8)

## 2020-08-19 NOTE — NUR
ECHO COMPLETED.  PT UP TO VOID WITH RN AT HER SIDE. VOIDS 300ML WITHOUT
COMPLAINT. PROVIDED WITH WARM WET WASH CLOTH FOR VANESA CARE PER SELF.  BACK TO
BED AND POSITIONED WITH HEAD OF BED RAISED SO THAT SHE IS ABLE TO EAT LUNCH.
CALL LIGHT IN REACH WITH SIDE RAILS UP X 2.

## 2020-08-19 NOTE — NUR
UP TO VOID WITH ASSISTANCE GIVEN.  PT ABLE TO VOID 100ML, COMPLAINS OF RECTAL
PAIN AFTER VOID WHICH SHE SAYS DR CAO TOLD HER IS EXCEPTED FOR TYPE OF
PROCEDURE SHE HAD DONE.
SHE IS ABLE TO WALK BACK TO BED AND POSITION SELF FOR
COMFORT ALL WITH ASSISTANCE FROM RN.  AM ASSESSMENT COMPLETED WITH MEDS GIVEN
AS SCANNED TO EMAR. XRAY AT BEDSIDE FOR CHEST XRAY AS ORDERED.

## 2020-08-19 NOTE — NUR
RN TO PT BEDSIDE FOR ROUNDING, PT REQUESTS TO GO TO BATHROOM, GAIT IS STEADY
WITH PERIODS OF LOSS OF BALANCE. PT VOIDED 150ML IN UINE HAT, URINE COLOR IS
CLEAR AND LIGHT GREEN. PT STATES SHE HAS 1-2/10 PAIN TO HER RECTUM, PT DENIES
ANY NEEDS CURRENTLY. IV START ATTEMPTED X2 AT THIS TIME, UNABLE TO OBTAIN IV.
BED IN LOWEST POSITION, SIDE RAILS UPX2, CALL LIGHT IN REACH.

## 2020-08-19 NOTE — NUR
ATTEMPT TO FLUSH SALINE LOCK PRIOR TO SOLU-MEDROL, SALINE LOCK NOTED TO BE
INFILTRATED.  PT STATES UNDERSTANDING THAT IV WOULD NEED TO BE RESTARTED.

## 2020-08-19 NOTE — NUR
RN TO PT BEDSIDE, PT STATES SHE NEEDS TO USE THE BATHROOM TO VOID, PT ASSISTED
TO BATHROOM BY RN, GAIT IS STEADY WITH PERIODIC PERIODS OF UNSTEADINESS. PT
VOIDED 300ML IN URINE HAT, URINE IS GREEN TINGED, NO CLOTS SEEN. PT PROVIDED
NEW BRIEFS AND PADS AT THIS TIME, PT'S BED PADS CHANGED. PT ASSISTED BACK TO
BED BY RN, SCD'S PLACED BACK ON PT, PT'S O2 PLACED BACK ON WITH 8L OF O2 VIA
HIGH FLOW NASAL CANNULA, PULSE OX PLACED BACK ON PT, PT PERFORMED INCENTIVE
SPIROMETRY IN THE PRESENCE OF THE RN, PT ABLE TO INHALE BETWEEN 500ML AND
850ML DURING INHALATION. SPO2 CURRENTLY 93% ON 8L OF O2. PT DENIES ANY OTHER
NEEDS AT THIS TIME, PT INSTRUCTED TO CALL OUT WHEN SHE NEEDS ASSISTANCE
GETTING UP, PT VERBALIZES UNDERSTANDING. BED IN LOWEST POSITION, SIDE RAILS
UPX2, CALL LIGHT IN REACH, NON-SKID SOCKS ON.

## 2020-08-19 NOTE — NUR
CALLED TO ROOM, PT UP TO VOID WITH ASSISTANCE FROM RN.  VOIDS 100ML, URINE IS
STILL LIGHT BLUE IN COLOR BUT CLEAR.  OFFERED TO BRING CHAIR TO BEDSIDE FOR
HER TO SIT IN AND SHE IS AGREEABLE. PRIOR TO O2 BEING RECONNECT O2SAT IS 91%,
STARTED O2 AT 2L VIA NC, PT SAT IS 93%.  CALL LIGHT AND PHONE IN REACH.

## 2020-08-19 NOTE — NUR
Rehab Note- Acute Inpatient Rehab prescreen order received. The patient
continues to have an acute work up and is currently on 6L/High FLow oxygen at
this time. Will follow for possible acute inpatient rehab stay prior to being
discharged home. Thank you for this referral!
Shweta Estes RN
Clinical Liaison, [facility]

## 2020-08-19 NOTE — RHP
PATIENT: SADIE VELASQUEZ                                 MEDICAL RECORD: [id number]
ACCOUNT: [id number]                                    LOCATION:[facility]1115
: 47                                            ADMISSION DATE: 20
                                                         
 
                     REHABILITATION HISTORY AND PHYSICAL EXAMINATION
                         POST ADMISSION PHYSICIAN EXAMINATION
 
 
ADMITTING DIAGNOSIS:  Debility.
 
HISTORY OF PRESENT ILLNESS:  The patient is admitted to rehab secondary to
debility secondary to hypovolemic and hypercapnic respiratory failure.  The
patient is a 74-year-old morbidly obese female who had an elective posterior
repair of a cystocele and cystoscopy.  She had rectal repair and vaginal vault
suspension.  She has been complications including hypoxic and hypercapnic
respiratory failure after this.  She has had a pleural effusion.  The patient
had elevated BNP.  The patient also had pulmonary consultation during her stay. 
She has been receiving supplemental O2.  She has been receiving PT and
progressing slowly.  She is currently on an increased amount of supplemental O2
to keep her O2 sats above 92%.  She has had some postop confusion.  The patient
is also being watched closely to monitor any areas in her vaginal and rectal
area that could be giving her problems.  She is on electrolyte protocol.  She
has got proximal muscle weakness, balance deficits, impaired mobility, decreased
range of motion.  She has got gait disturbance, low endurance, inability to care
for herself and self-care deficits.  These are all barriers to her discharge
home.  She lives in an apartment, was independent with ADLs and mobility prior
to this.  She is using a CPAP as needed.  She plans to return home at her prior
level of functioning or better if possible.
 
COMORBIDITIES:  In this patient include weakness, she has got morbid obesity. 
She has got chronic bronchitis, hypertension, fatty liver disease, osteoporosis,
depression, bipolar, enterocele, cystocele and a recent cystoscopy.
 
PAST MEDICAL HISTORY:  Significant for urinary incontinence, numbness, vertigo,
cataracts, trouble swallowing, blindness, edema, hypertension, heart murmur,
chronic cough.  She has had gastroparesis, chronic back pain, osteoporosis, knee
problems.
 
PAST SURGICAL HISTORY:  Includes hysterectomy.  She has got history of
gallbladder, hysterectomy, hemorrhoidectomy, neck surgery and cataract removal.
 
ALLERGIES:  PINE, [place], LASIX, NITROGLYCERIN, FOSAMAX, ANY TYPE OF
CONTRAST.
 
CURRENT MEDICATIONS:  Include metoprolol XL 25 mg daily.  She is on Lamictal 200
mg daily, Flonase nasal spray daily, Lexapro 20 mg daily, Klonopin 0.5 mg daily,
Perforomist 20 mcg daily, budesonide 0.5 mg b.i.d., Singulair 10 mg at bedtime,
Reglan 5 mg at bedtime, Tylenol No. 3 one every 6 hours p.r.n., and MiraLax as
needed.
 
HABITS:  No alcohol or tobacco use.
 
FAMILY HISTORY:  Noncontributory.
 
SOCIAL HISTORY:  The patient hopes to return back home and get back to her prior
level of functioning.
 
 
 
 
HISTORY AND PHYSICAL                           [id number]    SADIE VELASQUEZ         
 
 
REVIEW OF SYSTEMS:
GENERAL:  Does complain of weakness and fatigue.
HEENT:  Denies cold, cough, or congestion.
CARDIOVASCULAR:  Denies any chest pain.
 
PHYSICAL EXAMINATION:
VITAL SIGNS:  Stable, afebrile.
GENERAL:  A somewhat obese female, in no distress upon exam.
HEENT:  Normocephalic and atraumatic. Mucosa moist.
NECK:  Supple.  No lymphadenopathy.
LUNGS:  Clear in upper fields with decreased breath sounds in the bases.
HEART:  Regular rate and rhythm.  She does have a holosystolic murmur.
ABDOMEN:  Soft, benign, and nondistended.  Positive bowel sounds times 4.
EXTREMITIES:  No clubbing, cyanosis.  She does have a little trace of edema.
NEUROLOGIC:  She is somewhat slow to mentate at times and she also has some
noted proximal muscle weakness.
 
LABORATORY DATA:  On admit labs, her white count is 10,000, her H&H of 11 and
35, and platelet count is noted to be 194.  Her sodium is 138, potassium 3.0,
BUN and creatinine of 22 and 0.8, blood sugar is noted to be 117.
 
ASSESSMENT:  This is a 74-year-old female patient admitted to rehab with a
working diagnosis of debility secondary to recent surgery.  The patient has
potential to make improvement.  We instituted the following multidisciplinary
therapies including, but not limited to physical, occupational, respiratory,
speech, nutritional services, prosthetics and orthotics.  Given her complex
medical condition and risks for more complications, rehabilitation services
cannot be provided at a low level of care such as skilled nursing facility.
 
PLAN:
1.  Admit to [facility] for inpatient therapy to include the following
disciplines;
A.  Physical therapy to improve gait, all transfer skills and bed mobility to a
modified independent level.
B.  Occupational therapy to improve activities of daily living.
C.  Case management to help with discharge planning and placement options.
D.  Nutrition to assist with nutritional needs.
E.  Rehabilitation nursing to assist in monitoring the patient's underlying
medical conditions and to assist with any type of bowel or bladder management.
2.  The patient's current medication and medical care will be continued.
3.  Placed on standard fall precautions.
4.  The patient's estimated length of stay is approximately 7-10 days.
5.  We will discuss the patient during care team staff meeting this week.  I am
going to go ahead and replace her potassium.  She is requesting something for
sleep, so we will give her some medicine at bedtime and she would like some
Tessalon Perles for chronic cough, we do that and I will see again in the a.m.
 
TRANSINT:[id number] Voice Confirmation ID: [id number] DOCUMENT ID: [id number]
 
 
SATISH notes whether there has been none or any medical/functional
change since admission:
- No change since preadmission screen.                                  
 
 
 
 
 
HISTORY AND PHYSICAL                           [id number]    SADIE VELASQUEZ attests patient continues to be appropriate for IRF:
- Continues to be appropriate.                                          
 
 
                                           
                                           ASHLEY SEBASTIAN MD           
 
 
 
 
 
 
 
 
 
 
 
 
 
 
 
 
 
 
 
 
 
 
 
 
 
 
 
 
 
 
 
 
 
 
 
 
 
 
 
 
 
CC:                                                             [id number]
DICTATION DATE: 20 1237     :     20 1522      ADM IN  
                                                                              
[address] [place] [address]

## 2020-08-19 NOTE — MORECARE
CASE MANAGEMENT DISCHARGE SUMMARY
 
 
PATIENT: SADIE VELASQUEZ                     UNIT: [id number]
ACCOUNT#: [id number]                       ADM DATE: 20
AGE: 74     : [birth year]  SEX: F            ROOM/BED: D.[facility]    
AUTHOR: RONNY LOFTON                             PHYSICIAN:                               
 
REFERRING PHYSICIAN: JAYCE CAO MD          
DATE OF SERVICE: 20
Discharge Plan
 
 
Patient Name: SADIE VELASQUEZ
Facility: [facility]:[place]
Encounter #: [id number]
Medical Record #: [id number]
: 1945
Planned Disposition: Inpatient Rehab
Anticipated Discharge Date: 20
 
Discharge Date: 
Expected LOS: 1
Initial Reviewer: [email or web address]
Initial Review Date: 2020
Generated: 20   4:52 pm 
  
 
 
 
 
 
 
 
Patient Name: SADIE VELASQUEZ
 
Encounter #: [id number]
Page [id number]
 
 
 
 
 
Electronically Signed by RONNY LOFTON on 20 at 1553
 
 
 
 
 
 
**All edits/amendments must be made on the electronic document**
 
DICTATION DATE: 20 155     : DONNIE  20 1552     
RPT#: [id number]                                DC DATE:        
                                               STATUS: ADM IN  
[facility]
[address] [place] [address]
***END OF REPORT***

## 2020-08-19 NOTE — NUR
PATIENT SLEEPING WITH EVEN RESPIRATIONS. NO DISTRESS NOTED. O2 CONTINUES ON
8L/MIN VIA HIGH FLOW NASAL CANNULA. WILL CONTINUE TO MONITOR

## 2020-08-19 NOTE — NUR
RN TO PT BEDSIDE, PT REQUESTS TO GO TO BATHROOM, RN ASSISTED PT TO BATHROOM,
GAIT IS STEADY, WITH PERIODS OF UNSTEADINESS. PT VOIDED 250ML IN URINE HAT,
CLEAR GREEN COLOR. PT ASSISTED TO BED AT THIS TIME, LAB IN ROOM TO DRAW LABS.
BED IN LOWEST POSITION, SIDE RAILS UPX2, CALL LIGHT IN REACH.

## 2020-08-19 NOTE — NUR
oxygen rate turned down to 6l/min at this time. pt sleeping with even
respirations, no distress noted. will continue to monitor

## 2020-08-19 NOTE — NUR
RN CALLED TO PT ROOM, PT REQUESTS ASSISTANCE TO THE BATHROOM. PT GAIT IS
STEADY, PT REPORTS THAT SHE FEELS URGES TO PEE AND URINE TRICKLES OUT, PT
VOIDED 300ML IN URINE HAT, URINE IS CLEAR AND GREEN IN COLOR. PT PROVIDED NEW
PADS. PT ASSISTED TO BED, PT TO LEFT LATERAL POSITION, SCD'S PLACED ON PT'S
LEGS BILATERALLY, 6L O2 VIA NC PLACED ON PT, O2 SENSOR PLACED ON PT.

## 2020-08-19 NOTE — NUR
MEDS GIVEN AS SCANNED TO EMAR.  PT UP TO VOID,  ENCOURAGED HER TO SIT ON SIDE
OF BED BEFORE STANDING,  ASSISTANCE BY THIS RN.  SHE IS ABLE TO VOID 50ML,
NEW MESH BRIEFS PROVIDED AS REQUESTED.  PT BACK TO BED AND SITS ON SIDE OF BED
TO TAKE AM MEDS.  POSITIONED SELF FOR COMFORT IN BED WITH HEAD OF BED ELEVATED
AND SCD PUT BACK ON BILAT.  PT HAS VISITOR AT BEDSIDE AT THIS TIME, SIDE RAILS
UP X 2 WITH CALL LIGHT IN REACH.

## 2020-08-19 NOTE — MORECARE
CASE MANAGEMENT DISCHARGE SUMMARY
 
 
PATIENT: SADIE VELASQUEZ                     UNIT: [id number]
ACCOUNT#: [id number]                       ADM DATE: 20
AGE: 74     : [birth year]  SEX: F            ROOM/BED: D.1273    
AUTHOR: ROLYL,DOC                             PHYSICIAN:                               
 
REFERRING PHYSICIAN: DAREN CAO MD          
DATE OF SERVICE: 20
Discharge Plan
 
 
Patient Name: SADIE VELASQUEZ
Facility: [facility]:[place]
Encounter #: [id number]
Medical Record #: [id number]
: 1945
Planned Disposition: Inpatient Rehab
Anticipated Discharge Date: 20
 
Discharge Date: 
Expected LOS: 1
Initial Reviewer: [email or web address]
Initial Review Date: 2020
Generated: 20   5:16 pm 
Comments
 
DCP- Discharge Planning
 
Updated by [id number]: Analilia Bey on 20   3:16 pm CT
Patient Name: SADIE VELASQUEZ                                     
Admission Status: Elective   
Accout number: [id number]                              
Admission Date: 2020   
: 1945                                                        
Admission Diagnosis:   
Attending: Daren Cao                                                
Current LOS:  1   
  
Anticipated DC Date: 2020   
Planned Disposition: Inpatient Rehab   
Primary Insurance: MEDICARE A & B   
  
  
Discharge Planning Comments: CM met with patient to discuss discharge 
planning / needs. Patient states she lives home alone. States home 
environment is safe. Was independent prior to hospitalization. States she 
plans to discharge to rehab. Signed [facility] for [facility] rehab. CM explained and 
patient signed DC IMM. Denies any other discharge planning needs at this time.
 
 CM will continue to follow and assist as needed with discharge planning / 
needs.   
  
  
  
  
  
  
: Analilia Bey
 DCPIA - Discharge Planning Initial Assessment
 
Updated by [id number]: Analilia Bey on 20   4:14 pm
*  Is the patient Alert and Oriented?
Yes
*  How many steps to enter\exit or inside your home?
 
*  PCP
[facility]
*  Pharmacy
[facility] on [facility]
*  Preadmission Environment
Home Alone
*  ADLs
Independent
*  Equipment
Elevated [place] Seat
Grab Bars
Rolling Walker
Shower Chair
*  Other Equipment
Aime Rollator, Adult Rollator,
*  List name and contact numbers for known caregivers / representatives who 
currently or will assist patient after discharge:
Lexi Tracie, did not know her phone number
*  Verbal permission to speak to the caregivers and representatives has been 
obtained from the patient.
No
*  Community resources currently utilized
None
*  Additional services required to return to the preadmission environment?
Yes
*  Can the patient safely return to the preadmission environment?
Yes
*  Has this patient been hospitalized within the prior 30 days at any 
hospital?
No
 
 
 
 
 
Coverage Notice
 
 
Reviewer: [id number] Chuck Bey
 
Notice Issued Date-Time: 2020  15:00
Notice Type: IM Discharge Notice
 
Notice Delivered To: Patient
Relationship to Patient: Self
Representative Name: 
 
Delivery Method: HAND - Hand Delivered
Mary Days:
Prior Verbal Notification: 
 
Recipient Understood Notice: Yes
Recipient Signature: Yes
Med Rec Note Co-signed by Attending:
 
Coverage Notice Comment:  
 
Last DP export: 20   2:53 p
Patient Name: SADIE VELASQUEZ
Encounter #: [id number]
Page [phone]
 
 
 
 
 
Electronically Signed by RONNY LOFTON on 20 at 1617
 
 
 
 
 
 
**All edits/amendments must be made on the electronic document**
 
DICTATION DATE: 20     : DONNIE  20     
RPT#: [id number]                                DC DATE:        
                                               STATUS: ADM IN  
[facility]
[address] [place] [address]
***END OF REPORT***

## 2020-08-19 NOTE — NUR
DR MOSQUERA TALKS WITH CASE MANAGEMENT ABOUT TRANSFER TO REHAB, PER DR MOSQUERA HE
DOES NOT FEEL LIKE PT IS READY AND ADVISES AGAINST THIS TRANSFER AT THIS TIME.

## 2020-08-20 VITALS — DIASTOLIC BLOOD PRESSURE: 56 MMHG | SYSTOLIC BLOOD PRESSURE: 115 MMHG

## 2020-08-20 VITALS — DIASTOLIC BLOOD PRESSURE: 53 MMHG | SYSTOLIC BLOOD PRESSURE: 135 MMHG

## 2020-08-20 LAB
ANION GAP SERPL CALC-SCNC: 5.7 MMOL/L (ref 8–16)
BASOPHILS NFR BLD AUTO: 0 % (ref 0–2)
BUN SERPL-MCNC: 21 MG/DL (ref 7–18)
CALCIUM SERPL-MCNC: 8.5 MG/DL (ref 8.5–10.1)
CHLORIDE SERPL-SCNC: 100 MMOL/L (ref 98–107)
CO2 SERPL-SCNC: 35 MMOL/L (ref 21–32)
CREAT SERPL-MCNC: 1 MG/DL (ref 0.6–1.3)
EOSINOPHIL NFR BLD: 0 % (ref 0–7)
ERYTHROCYTE [DISTWIDTH] IN BLOOD BY AUTOMATED COUNT: 13.7 % (ref 11.5–14.5)
GLUCOSE SERPL-MCNC: 146 MG/DL (ref 74–106)
HCT VFR BLD CALC: 34.2 % (ref 36–48)
HGB BLD-MCNC: 10.4 G/DL (ref 12–16)
IMM GRANULOCYTES NFR BLD: 0.3 % (ref 0–5)
LYMPHOCYTES NFR BLD AUTO: 12.9 % (ref 15–50)
MAGNESIUM SERPL-MCNC: 2.1 MG/DL (ref 1.8–2.4)
MCH RBC QN AUTO: 28.5 PG (ref 26–34)
MCHC RBC AUTO-ENTMCNC: 30.4 G/DL (ref 31–37)
MCV RBC: 93.7 FL (ref 80–100)
MONOCYTES NFR BLD: 2.7 % (ref 2–11)
NEUTROPHILS NFR BLD AUTO: 84.1 % (ref 40–80)
OSMOLALITY SERPL CALC.SUM OF ELEC: 279 MOSM/KG (ref 275–300)
PHOSPHATE SERPL-MCNC: 2.5 MG/DL (ref 2.5–4.9)
PLATELET # BLD: 148 10X3/UL (ref 130–400)
PMV BLD AUTO: 9.4 FL (ref 7.4–10.4)
POTASSIUM SERPL-SCNC: 3.7 MMOL/L (ref 3.5–5.1)
RBC # BLD AUTO: 3.65 10X6/UL (ref 4–5.4)
SODIUM SERPL-SCNC: 137 MMOL/L (ref 136–145)
WBC # BLD AUTO: 7.8 10X3/UL (ref 4.8–10.8)

## 2020-08-20 NOTE — NUR
DR MOSQUERA ON UNIT AND AT BEDSIDE, EXPLAINS TO PT THAT SHE WILL STAY ANOTHER
NIGHT ON L/D AND AFTER CTA IN THE AM WILL REASSESS.  ORDERS RECEIVED THAT ARE
TO BEGING AT 1900 DUE TO PT ALLERGY TO IODINE. SEE EMAR FOR DOSE AND TIMES.

## 2020-08-20 NOTE — NUR
PT REQUEST TO GO TO BATHROOM AT THIS TIME. 200ML U.O. TO URINE HAT, LIGHT
GREEN IN COLOR, PT ASSISTED BACK TO BED AT THIS TIME.

## 2020-08-20 NOTE — NUR
AMB IN HALLS WITH PHYSICAL THERAPY.  O2 SAT REMAINS GREATER THAN 92% ON 1L NC.
NO LABORED BREATHING  NOTED AFTER WALKING IN HALLS X 2.  LARGE ICE WATER AS
REQUESTED.

## 2020-08-20 NOTE — NUR
RN TO PT BEDSIDE AT THIS TIME TO REDUCE O2, O2 REDUCED TO 2L VIA NC AT THIS
TIME, SPO2 IS 96% AT THIS TIME.

## 2020-08-20 NOTE — NUR
RN TO PT BEDSIDE, PT REQUESTS ASSISTANCE TO BATHROOM, PT ASSISTED TO BATHROOM,
PT VOIDED 700ML IN URINE HAT, URINE LIGHT GREEN IN COLOR. PT ASSISTED BACK TO
BED, PULSE OX APPLIED, O2 AT 1L APPLIED VIA NC. PT DENIES ANY OTHER NEEDS. BED
IN LOWEST POSITION, SIDE RAILS UPX2, CALL LIGHT IN REACH.

## 2020-08-20 NOTE — NUR
RN TO PT BEDSIDE, PT REQUESTS ASSISTANCE TO BATHROOM, PT ASSISTED TO BATHROOM,
PT VOIDED 300ML IN URINE HAT, URINE LIGHT GREEN IN COLOR. PT ASSISTED BACK
INTO BED, O2 PLACED BACK ON PT AT 3L VIA NC, PULSE OX PLACED BACK ON PT. BED
IN LOWEST POSITION, SIDE RAILS UPX2, CALL LIGHT IN REACH.

## 2020-08-20 NOTE — NUR
SHOWER SEAT PLACED IN SHOWER, PT UP TO SHOWER WITH LITTLE ASSISTANCE.  THIS RN
REMAINS IN ROOM THROUGHOUT HER SHOWER.  BED LINENS CHANGED WHILE PT SHOWERS.

## 2020-08-20 NOTE — NUR
pt resting on left side with eyes closed and resp even.  O2 sat 95% on 1L, no
signs of distress noted.  pt left undisturbed.

## 2020-08-20 NOTE — NUR
CALLED TO ROOM, SHE IS ABLE TO GET SELF UP FROM BED AND AMB TO BATHROOM WITH
NO NEED OF ASSISTANCE.  DIETARY CALLED ABOUT PT REQUESTED GRILL CHEESE. RATES
PAIN/DISCOMFORT AT 3/10.  VANESA CARE PER SELF AND AMB BACK TO BEDSIDE CHAIR. o2
SAT AT 94% WITH NO O2 IN PLACE.  ALLOWED TO EAT HER DINNER AT THIS TIME. CALL
LIGHT IN REACH.

## 2020-08-20 NOTE — NUR
RN TO PT BEDSIDE, PT SLEEPING AT THIS TIME, PULSE OX ADJUSTED ON HER FINGER
AND MOVED TO OTHER FINGER STILL READING BETWEEN 87-89%, PT PLACED ON 2L O2 VIA
HIGH FLOW NC AT THIS TIME.

## 2020-08-20 NOTE — NUR
PT UP TO BATHROOM SHE IS ABLE TO GET UP BY HERSELF AND IS ABLE TO WALK TO
BATHROOM WITH RN AT HER SIDE.  VOIDS 200ML CLEAR BLUEISH URINE WITHOUT
COMPLAINT.  RT AT BEDSIDE FOR TREATMENT.

## 2020-08-20 NOTE — NUR
RN TO PT BEDSIDE FOR ROUNDING, PT STATES SHE NEEDS TO USE THE BATHROOM. PT
ASSISTED TO THE BATHROOM AT THIS TIME. PT VOIDED INTO TOILET, UNMEASURED
AMOUNT. PT ASSISTED BACK TO BED AT THIS TIME, STATES PAIN IS 6/10 ON PAIN
SCALE TO RECTUM "BURNING" SENSATION. PT IS NOT ON O2 AT THIS TIME, PT'S SPO2
IS 95% AT THIS TIME ON ROOM AIR, VSS. PT DENIES ANY NEEDS AT THIS TIME, BED IN
LOWEST POSITION, SIDE RAILS UPX2, CALL LIGHT IN REACH.

## 2020-08-21 VITALS — SYSTOLIC BLOOD PRESSURE: 154 MMHG | DIASTOLIC BLOOD PRESSURE: 70 MMHG

## 2020-08-21 VITALS — SYSTOLIC BLOOD PRESSURE: 119 MMHG | DIASTOLIC BLOOD PRESSURE: 58 MMHG

## 2020-08-21 NOTE — NUR
PT CALLS OUT ON CALL LIGHT STATING SHE NEEDS TO GET UP TO BATHROOM. THIS RN TO
ROOM. PT HAS REMOVED OXYGEN AND PULSE OX, GETS UP OOB WITHOUT ASSIST AS THIS
RN ENTERS ROOM AND AMBULATES TO BATHROOM WITHOUT ASSIST. STEADY GAIT. PT
STATES SHE SOMETIMES GETS "WINDED" ON THE WAY BACK TO BED, BUT IS OK FOR NOW.
DENIES FEELING DIZZY OR SOB. PT WANTS TIME SITTING ON TOILET. THIS RN REMAINS
IN ROOM WITH PT FOR ASSIST IF NEEDED PER FALL PRECAUTIONS.

## 2020-08-21 NOTE — NUR
PT BACK TO ROOM FOR CT, PT TRANSFERRED BACK TO BED. PULSE OX 93%, OXYGEN
INCREASED TO 2L. PT SITTING UP IN BED, EATING BREAKFAST. POC DISCUSSED. PT
DENIES ANY NEEDS AT THIS TIME. WILL CONT TO MONITOR.

## 2020-08-21 NOTE — NUR
PT CALLS OUT ON BATHROOM CALL LIGHT, THIS RN TO ROOM. PT STANDING AND BRACING
SELF ON WALKER, STATES SHE WANTED TO REPORT THAT SHE HAD A BLACK TARRY STOOL,
FOLLOWED BY A BLOODY STOOL. DR CAO ON UNIT, NOTIFIED OF PT REPORTS BUT
STOOL ALREADY FLUSHED. STATES HE WILL LIKELY ORDER A GI CONSULT LATER. NO NEW
ORDERS RECEIVED AT THIS TIME.

## 2020-08-21 NOTE — EC
PATIENT:SADIE VELASQUEZ                 DATE OF SERVICE: 08/18/20
SEX: F                                  MEDICAL RECORD: [id number]
DATE OF BIRTH: [birth year]                        LOCATION:[facility]
AGE OF PATIENT: 74                             ADMISSION DATE: 08/18/20
 
REFERRING PHYSICIAN:                               
 
INTERPRETING PHYSICIAN: TANIA SALAZAR MD          
 
 
 
                             ECHOCARDIOGRAM REPORT
  ECHO CHARGES 4               ECHO COMPLETE                 Date: 08/19/20
 
 
 
CLINICAL DIAGNOSIS: SOB                           
 
                         ECHOCARDIOGRAPHIC MEASUREMENTS
      (adult normal given)
   AC root (d.<3.7cm) 2.6  cm   LV Septum d (<1.2 cm> 0.9  cm
      Valve Excursion 1.4  cm     LV Septum (systole) 1.5  cm
Left Atria (s.<4.0cm> 4.2  cm          LVPW d(<1.2cm) 1.0  cm
        RV (d.<2.3cm) 2.1  cm           LVPW (sytole) 1.1  cm
  LV diastole(<5.6CM) 5.1  cm       MV E-F(>70mm/sec)      cm
           LV systole 3.7  cm           LVOT Diameter 1.7  cm
       MV exc.(>10mm)      cm
Est.ejection fraction (50-75%)     %
 
   DOPPLER:
     LVIT      cm/sec A 116  cm/sec E 138   cm/sec
       LA      cm/sec      RVSP 36.8 mmHg
     LVOT 113  cm/sec   AOP1/2T      m/s
  Asc. Ao 185  cm/sec
     RVOT 75   cm/sec
       RA      cm/sec
       PA 83   cm/sec
 AV Gradient Peak 13.7 mmHg  AV Mean 8.0  mmHg  AV Area 1.6  cm
 MV Gradient Peak 10.5 mmHg  MV Mean 4.6  mmHg  MV Area      cm
   COMMENTS:                                              
 
 
 Cardiac Sonographer: Crescencio SHELTON             
      Cardiologist: 3          Dr. Izquierdo             
             TAPE# PACS           
                                       Pericardial Effusion N                        
 
 
DATE OF SERVICE:  
 
Adequate 2D, color flow imaging, spectral Doppler, and M-Mode.
 
No LVH.  LV internal dimensions are normal.  Wall motion is normal.  EF is
greater than or equal to 55%.  Aortic valve is tricuspid.  No evidence of
stenosis by Doppler interrogation.  Left atrium minimally dilated at 4.2 cm. 
Mitral valve shows no prolapse.  Trace MR.  Right-sided chambers are grossly
normal.  Trace TR.
 
 
 
 
ECHOCARDIOGRAM REPORT                          [id number]    SADIE VELASQUEZ         
 
 
TRANSINT:[id number] Voice Confirmation ID: [id number] DOCUMENT ID: [id number]
                                           
                                           TAINA SALAZAR MD          
 
 
 
Electronically Signed by TANIA SALAZAR on 08/21/20 at 0819
 
 
 
 
 
 
 
 
 
 
 
 
 
 
 
 
 
 
 
 
 
 
 
 
 
 
 
 
 
 
 
 
 
 
 
 
 
 
 
CC:                                                             [id number]
DICTATION DATE: 08/20/20 1313     :     08/20/20 1401      ADM IN  
                                                                              
[place] [id number] [place] [id number]

## 2020-08-21 NOTE — NUR
PT ON CALL LIGHT, STATES SHE NEEDS TO GET UP TO BR. PT TO BR WITH WALKER, NO
ASSIST NEEDED. PT STATES SHE WANTS TO SIT ON TOILET FOR A WHILE. STATES SHE
MAY HAVE BOWEL MOVEMENT, BUT KNOWS NOT TO STRAIN. PT INSTRUCTED TO PULL CORD
NEXT TO HER IF IN NEED OF ASSIST. UNDERSTANDING VERBALIZED. SRUx2, CL IN
REACH.

## 2020-08-21 NOTE — NUR
RESP THERAPIST ON UNIT, STATES HE SPOKE WITH DR MOSQUERA REGARDING CTA OF CHEST
DONE THIS AM AND THAT DR MOSQUERA SAID HE IS AWARE AND HAD READ REPORT. WILL
AWAIT DR MOSQUERA TO ROUND FOR UPDATED PLAN FOR PT.

## 2020-08-21 NOTE — NUR
SHIFT ASSESSMENT COMPLETE, VS OBTAINED. SYSTOLIC BP NOTED TO BE SLIGHTLY
ELEVATED. PT COUGHING, STATES IT IS ONLY OCCASIONLY PRODUCTIVE WITH BLOOD FROM
THROAT IRRITATION, STATES IT'S MOSTLY A DRY COUGH. PT REPORTS HER CHEST
MUSCLES HURTING FROM COUGHING. POC DISCUSSED WITH PT AS WELL AS PRE-PROCEDURE
MEDS GIVEN PER DR MOSQUERA'S ORDER TO FOLLOW CONTRAST ALLERGY PROTOCOL. RATIONALE
EXPLAINED TO PT AND THAT SHE MUST REMAIN NPO FOR PROCEDURE OTHER THAN SIPS OF
WATER WITH MEDS. UNDERSTANDING VERBALIZED. PT OXYGEN ON 1L VIA NC, SAT 96%.
LEFT FOREARM PIV FLUSHED TO DETERMINE PATENCY, FLUSHES WELL. PT DENIES NEEDS
AT THIS TIME. SRUx2, CL IN REACH. SEE FLOWSHEET FOR FULL SHIFT ASSESSMENT.

## 2020-08-21 NOTE — NUR
DR CAO PHONES UNIT FOR UDPATE ON PT. STATES SHE HAS BEEN CLEARED FROM HIS
STANDPOINT. DISCUSSED PT REQUEST FOR STOOL SOFTENER AFER STRAINING THIS
MORNING, ORDER RECEIVED FOR 100MG COLACE PO BID AND DULCOLAX SUPPOSITORY BID
PRN. ORDER ALSO RECEIVED FOR PT TO HAVE WALKER AS ASSISTIVE DEVICE. WILL
PROCEED AS ORDERED.

## 2020-08-21 NOTE — NUR
PT GIVEN D/C INSTRUCTIONS AS WELL AS FOLLOW UP TRINI WITH DR CAO. PT
VERBALIZES UNDERSTANDING AND DENIES QUESTIONS. PT SIGNS CHART COPIES.

## 2020-08-21 NOTE — NUR
THIS RN TO ROOM TO ROOM FOR MED ADMIN. PT STATES SHE IS HAVING A PANIC ATTACK
AND NEEDS HER ANXIETY MED. DR CAO ON UNIT, INFORMED OF PT REPORT OF
PANIC ATTACK AND MED REQUEST. ORDER RECEIVED FOR 1MG XANAX PO x1 DOSE NOW.
STATES WILL UPDATE PT MED RECONCILLIATION FOR D/C TO REHAB.
 
THIS RN TO ROOM, MEDS ADMIN AS ORDERED FOR ANXIETY AND AS SCHEDULED, SEE EMAR.
FANS DELIVERS DINNER TRAY. POC DISCUSSED, WILL TRANSFER TO REHAB WHEN
ANTIBIOTIC FINISHED INFUSING. UNDERSTANDING VERBALIZED.

## 2020-08-21 NOTE — NUR
HOME MED REC COMPLETED AND FINALIZED WITH DR CAO ON UNIT. PRINTED COPIES
OF D/C INSTRUCTIONS AND HOME MED REC PROVIDED TO NOREEN ANN ON REHAB.

## 2020-08-21 NOTE — NUR
RADIOLOGY DEPT PHONED AGAIN, INFORMED STILL WAITING ON CT AND THAT PT HAS BEEN
PREMEDICATED AS ORDERED 2 HOURS AGO AND IS STILL NPO UNTIL AFTER PROCEDURE,
WAITING TO EAT HER BREAKFAST. STATES CT HAS BEEN BUSY THIS AM, WILL FIND A CT
STAFF MEMBER AND NOTIFY THEM.

## 2020-08-21 NOTE — NUR
DR MOSQUERA ON UNIT ROUNDING ON PT. ORDER RECEIVED TO DISCHARGE PT TO REHAB,
CONSULT HIM ONCE SHE IS IN REHAB, AND CONTINUE ALL RESPIRATORY THERAPY MEDS
CURRENTLY ORDERED. WILL PROCEED AS ORDERED.

## 2020-08-21 NOTE — NUR
PT FINISHES IN BR AND AMBULATES BACK TO BED WITHOUT ASSIST, STEADY GAIT. PT
STATES SHE FEELS OK, NOT DIZZY OR SOB. O2 SATS NOTED TO B 93%, 1L REPLACED VIA
NC AND SATS BEGIN TO RISE 94%-95%. 0900 MEDS GIVEN AS SCHEDULED. PT TAKES WITH
SMALL SIPS OF WATER. LOVENOX ADMIN AS SCHEDULED AS WELL. PT DENIES NEEDS AT
THIS TIME, QUESTIONS WHEN CT WILL BE HERE BECAUSE SHE IS READY TO EAT HER
BREAKFAST. WILL PHONE RADIOLOGY DEPT AGAIN. PT LYING IN BED, SUPINE WITH LEFT
TILT. SRUx2, CL IN REACH.

## 2020-08-21 NOTE — NUR
PT CALLS OUT ON CALL LIGHT STATING SHE NEEDS TO GET UP TO BR. RN TO ROOM. PT
UP TO BR WITHOUT ASSIST. PT VOIDING, REQUESTS SUPPOSITORY AS ORDERED FROM DR CAO. THIS RN BACK TO ROOM. PT STATES SHE JUST HAD A BOWEL MOVEMENT AND
NO LONGER NEEDS SUPPOSITORY. PT AGREEABLE TO COLACE STOOL SOFTENER FOR EASE OF
HAVING BM NEXT TIME. ADMIN AS ORDERED PO. PT BACK TO BED WITHOUT ASSIST.
OXYGEN AND PULSE OX REAPPLIED. PT DENIES FURTHER NEEDS AT THIS TIME. SRUx2, CL
IN REACH.

## 2020-08-21 NOTE — NUR
RADIOLOGY PHONED TO NOTIFY PT WAS GIVEN PRE-PROCEDURE MEDS 1 HOUR AGO PER DR MOSQUERA'S ORDER AND SHE IS READY FOR CTA NOW. STATES WILL NOTIFY CT STAFF WHEN
BACK ON FLOOR.

## 2020-08-21 NOTE — NUR
PT CALLS OUT ON CALL LIGHT STATING SHE NEEDS TO GET UP TO BR. THIS RN TO ROOM.
PT GETS SELF UP TO BR TO VOID, AMBULATES WITHOUT ASSIST. PT STATES SHE
STRAINED A LITTLE THINKING SHE WAS GOING TO HAVE A BM BUT DIDN'T. STATES SHE
NOW HAS VAGINAL SPOTTING. SCANT AMOUNT NOTED TO TOILET PAPER. PT AMBULATES
BACK TO BED WITHOUT ASSIST. O2 SATS NOTED TO BE 93%-94% ON ROOM AIR. PT PLACED
ON 1L O2 VIA NC. VS OBTAINED AND NOTED TO BE STABLE. FRESH ICE WATER AND
SPRITE GIVEN PER REQUEST. PT DENIES FURTHER NEEDS. LIGHTS IN ROOM DIMMED FOR
REST. SRUx2, CL IN REACH.

## 2020-08-21 NOTE — NUR
RN TO PT BEDSIDE AT THIS TIME, PT REQUESTS FOR STANDBY ASSISTANCE TO THE
BATHROOM, PT AMBULATED TO BATHROOM, VOIDED UNMEASURED AMOUNT, PT WAS ASSISTED
BACK TO BED,PULSE OX REAPPLIED.

## 2020-08-21 NOTE — NUR
PT DISCHARGED TO REHAB PER ORDER, PT TRANSFERRED VIA W/C TO ROOM 1115. PHONE
REPORT CALLED TO NOREEN ANN ON REHAB PRIOR TO MOVING PT TO REHAB UNIT. PT
OXYGEN CONNECTED AS ORDERED, DINNER TRAY SET UP, PT ORIENTED TO NEW ROOM AND
CALL LIGHT. Nika2, CL IN REACH.

## 2020-08-21 NOTE — NUR
RECEIVED IN PATIENT'S BATHROOM.  AWAKE AND ALERT.  DENIES ANY NEEDS AT THIS
TIME.  ASSESSMENT COMPLETED.  MONITOR FOR SAFETY,  CALL LIGHT IN REACH. CPOC.

## 2020-08-22 VITALS — SYSTOLIC BLOOD PRESSURE: 126 MMHG | DIASTOLIC BLOOD PRESSURE: 59 MMHG

## 2020-08-22 VITALS — DIASTOLIC BLOOD PRESSURE: 58 MMHG | SYSTOLIC BLOOD PRESSURE: 153 MMHG

## 2020-08-22 VITALS — SYSTOLIC BLOOD PRESSURE: 134 MMHG | DIASTOLIC BLOOD PRESSURE: 57 MMHG

## 2020-08-22 LAB
ANION GAP SERPL CALC-SCNC: 10.9 MMOL/L (ref 8–16)
BASOPHILS NFR BLD AUTO: 0 % (ref 0–2)
BUN SERPL-MCNC: 22 MG/DL (ref 7–18)
CALCIUM SERPL-MCNC: 9.1 MG/DL (ref 8.5–10.1)
CHLORIDE SERPL-SCNC: 98 MMOL/L (ref 98–107)
CO2 SERPL-SCNC: 32.1 MMOL/L (ref 21–32)
CREAT SERPL-MCNC: 0.8 MG/DL (ref 0.6–1.3)
EOSINOPHIL NFR BLD: 0.1 % (ref 0–7)
ERYTHROCYTE [DISTWIDTH] IN BLOOD BY AUTOMATED COUNT: 13.7 % (ref 11.5–14.5)
GLUCOSE SERPL-MCNC: 117 MG/DL (ref 74–106)
HCT VFR BLD CALC: 35.7 % (ref 36–48)
HGB BLD-MCNC: 10.6 G/DL (ref 12–16)
IGE SERPL-ACNC: 129 IU/ML (ref 6–495)
IMM GRANULOCYTES NFR BLD: 0.6 % (ref 0–5)
LYMPHOCYTES NFR BLD AUTO: 30 % (ref 15–50)
MCH RBC QN AUTO: 27.7 PG (ref 26–34)
MCHC RBC AUTO-ENTMCNC: 29.7 G/DL (ref 31–37)
MCV RBC: 93.2 FL (ref 80–100)
MONOCYTES NFR BLD: 7.2 % (ref 2–11)
NEUTROPHILS NFR BLD AUTO: 62.1 % (ref 40–80)
OSMOLALITY SERPL CALC.SUM OF ELEC: 279 MOSM/KG (ref 275–300)
PLATELET # BLD: 194 10X3/UL (ref 130–400)
PMV BLD AUTO: 9.1 FL (ref 7.4–10.4)
POTASSIUM SERPL-SCNC: 3 MMOL/L (ref 3.5–5.1)
RBC # BLD AUTO: 3.83 10X6/UL (ref 4–5.4)
SODIUM SERPL-SCNC: 138 MMOL/L (ref 136–145)
WBC # BLD AUTO: 10 10X3/UL (ref 4.8–10.8)

## 2020-08-22 NOTE — NUR
PATIENT AWAKE SITTING ON BEDSIDE FILLING OUT MENU. NO C/O PAIN OR DISTRESS AT
THIS TIME. BED LOW. ALARM ON. CALL LIGHT WITHIN REACH. WILL CONTINUE TO
MONITOR.

## 2020-08-22 NOTE — NUR
SITTING UP IN WC IN ROOM EATING LUNCH.INDEPENDENT IN ROOM.CL IN REACH.USES
PERSONAL RW AND SAFETY PRECAUTIONS.

## 2020-08-23 VITALS — SYSTOLIC BLOOD PRESSURE: 114 MMHG | DIASTOLIC BLOOD PRESSURE: 41 MMHG

## 2020-08-23 VITALS — DIASTOLIC BLOOD PRESSURE: 42 MMHG | SYSTOLIC BLOOD PRESSURE: 118 MMHG

## 2020-08-23 NOTE — NUR
RESTING IN BED WITH EYES CLOSED.  NO S/S OF DISTRRESS NOTED.  RESP EVEN AND
UNLABORED.  C/L IN EASY REACH.  CONTINUE POC.

## 2020-08-24 VITALS — SYSTOLIC BLOOD PRESSURE: 145 MMHG | DIASTOLIC BLOOD PRESSURE: 100 MMHG

## 2020-08-24 VITALS — DIASTOLIC BLOOD PRESSURE: 59 MMHG | SYSTOLIC BLOOD PRESSURE: 134 MMHG

## 2020-08-24 VITALS — SYSTOLIC BLOOD PRESSURE: 93 MMHG | DIASTOLIC BLOOD PRESSURE: 69 MMHG

## 2020-08-24 NOTE — NUR
ASSESSMENT PER FLOW SHEET, VS OBTAINED, PT REPORTS FLATUS, BMX2 TODAY, AND
VOIDING WITH NO DIFFICULTY, PT REPORTS SCANT VAG BLEEDING OCCASSIONALLY, PT
SALINE LOCK REMOVED, TIP INTACT, PRESSURE HELD, BANDAID APPLIED, PT DENIES
NEEDS OR PAIN AT THIS TIME, PT UP IN WC

## 2020-08-24 NOTE — NUR
RECEIVED SHIFT REPORT, INFORMED PT THAT I WILL BE BACK SHORTLY TO DO
ASSESSMENT, PT VERBALIZES UNDERSTANDING, REQUESTS SALINE LOCK TO BE REMOVED,
REPORTS THAT IT IS HURTING, INFORMED PT THAT I WILL TAKE IT OUT WHEN I COME
BACK, PT VERBALIZES UNDERSTANDING, DENIES FURTHER NEEDS

## 2020-08-24 NOTE — NUR
HAS BEEN UP WALKING AROUND IN ROOM. SHE USES WALKER FOR AMBULATION ASST. NO
SKIN BREAKDOWN NOTED. DENIES INCREASED PAIN.

## 2020-08-24 NOTE — MORECARE
CASE MANAGEMENT DISCHARGE SUMMARY
 
 
PATIENT: SADIE VELASQUEZ                     UNIT: [id number]
ACCOUNT#: [id number]                       ADM DATE: 20
AGE: 74     : [birth year]  SEX: F            ROOM/BED: D.1273    
AUTHOR: ROLLY,DOC                             PHYSICIAN:                               
 
REFERRING PHYSICIAN: DAREN CAO MD          
DATE OF SERVICE: 20
Discharge Plan
 
 
Patient Name: SADIE VELASQUEZ
Facility: [facility]
Encounter #: [id number]
Medical Record #: [id number]
: 1945
Planned Disposition: Inpatient Rehab
Anticipated Discharge Date: 20
 
Discharge Date: 2020
Expected LOS: 1
Initial Reviewer: [id number]
Initial Review Date: 2020
Generated: 20  10:27 am 
Comments
 
DCP- Discharge Planning
 
Updated by [id number]: Analilia Bey on 20   3:16 pm CT
Patient Name: SADIE VELASQUEZ                                     
Admission Status: Elective   
Accout number: [id number]                              
Admission Date: 2020   
: 1945                                                        
Admission Diagnosis:   
Attending: Daren Cao                                                
Current LOS:  1   
  
Anticipated DC Date: 2020   
Planned Disposition: Inpatient Rehab   
Primary Insurance: MEDICARE A & B   
  
  
Discharge Planning Comments: CM met with patient to discuss discharge 
planning / needs. Patient states she lives home alone. States home 
environment is safe. Was independent prior to hospitalization. States she 
plans to discharge to rehab. Signed [facility] for [facility] rehab. CM explained and 
patient signed DC IMM. Denies any other discharge planning needs at this time.
 
 CM will continue to follow and assist as needed with discharge planning / 
needs.   
  
  
  
  
  
  
: Analilia Bey
 DCPIA - Discharge Planning Initial Assessment
 
Updated by [id number]: Analilia Bey on 20   4:14 pm
*  Is the patient Alert and Oriented?
Yes
*  How many steps to enter\exit or inside your home?
 
*  PCP
[facility]
*  Pharmacy
[facility] on [address]
*  Preadmission Environment
Home Alone
*  ADLs
Independent
*  Equipment
Elevated [place] Seat
Grab Bars
Rolling Walker
Shower Chair
*  Other Equipment
Aime Rollator, Adult Rollator,
*  List name and contact numbers for known caregivers / representatives who 
currently or will assist patient after discharge:
Lexi Hodges, did not know her phone number
*  Verbal permission to speak to the caregivers and representatives has been 
obtained from the patient.
No
*  Community resources currently utilized
None
*  Additional services required to return to the preadmission environment?
Yes
*  Can the patient safely return to the preadmission environment?
Yes
*  Has this patient been hospitalized within the prior 30 days at any 
hospital?
No
 
 
 
 
 
Coverage Notice
 
 
Reviewer: [email or web address] Bey
 
Notice Issued Date-Time: 2020  15:00
Notice Type: IM Discharge Notice
 
Notice Delivered To: Patient
Relationship to Patient: Self
Representative Name: 
 
Delivery Method: HAND - Hand Delivered
Mary Days:
Prior Verbal Notification: 
 
Recipient Understood Notice: Yes
Recipient Signature: Yes
Med Rec Note Co-signed by Attending:
 
Coverage Notice Comment:  
Reviewer: JENA Bey
 
Notice Issued Date-Time: 2020  15:00
Notice Type: Patient Choice Letter
 
Notice Delivered To: Patient
Relationship to Patient: Self
Representative Name: 
 
Delivery Method: HAND - Hand Delivered
Mary Days:
Prior Verbal Notification: 
 
Recipient Understood Notice: Yes
Recipient Signature: Yes
Med Rec Note Co-signed by Attending:
 
Coverage Notice Comment:  [facility]
 
Last DP export: 20   3:17 p
Patient Name: SADIE VELASQUEZ
 
Encounter #: [id number]
Page [id number]
 
 
 
 
 
Electronically Signed by RONNY LOFTON on 20 at 0928
 
 
 
 
 
 
**All edits/amendments must be made on the electronic document**
 
DICTATION DATE: 20     : DONNIE  20     
RPT#: [id number]                                DC DATE:20
                                               STATUS: DIS IN  
[facility]
[address] [place] [address]
***END OF REPORT***

## 2020-08-24 NOTE — NUR
SITTING ON SIDE OF BED EATING SUPPER. HAD SHOWER AND WAS ABLE TO DO MOST OF IT
HERSELF. SHE IS UNBALANCED AND UNSTEADY BUT CAN REACH HER FEET AND BEND OVER.
NO SKIN BREAKDOWN NOTED. DENIES INCREASED PAIN. CALL LIGHT IN REACH

## 2020-08-25 VITALS — DIASTOLIC BLOOD PRESSURE: 54 MMHG | SYSTOLIC BLOOD PRESSURE: 132 MMHG

## 2020-08-25 VITALS — DIASTOLIC BLOOD PRESSURE: 50 MMHG | SYSTOLIC BLOOD PRESSURE: 162 MMHG

## 2020-08-25 NOTE — NUR
PT RESTING WITH EYES CLOSED, AROUSES TO SOFT VERBAL STIMUALTION, ADM 0700 MED
PER MD ORDERS, SEE EMAR, PT DENIES NEEDS OR PAIN, TRASH REMOVED

## 2020-08-25 NOTE — NUR
PT TO NURSES DESK VIA WC, REQUESTED AND PROVIDED MILK AND CUP OF ICE, DENIES
FURTHER NEEDS, PT BACK TO ROOM

## 2020-08-25 NOTE — NUR
Nutrition Follow-up:
Diet: Regular
PO intake: ~58% average x last 9 meals
Last BM: 8/24/20 x 2. Wt: 190# (8/22/20)
Meds noted: prednisone, reglan, k-dur. Labs noted: K 3.0(L), Glu 117(H).
Recommend continue current diet. Patient previously with good appetite (PO
intake ~75%) while in acute care. Hopefully appetite will improve, prednisone
may increase appetite stome. Will continue to monitor. Offer oral nutrition
supplements. RD following.

## 2020-08-25 NOTE — NUR
PATIENT ADMITTED TO REHAB FROM ACUTE FLOOR. HER PCP IS DR. JACKSON. DME AT
HOME IS A WALKER AND A SHOWER CHAIR. DISCHARGE PLANS ARE FOR HER TO RETURN
HOME. WILL CONTINUE TO FOLLOW WITH PATIENT.

## 2020-08-25 NOTE — NUR
PT IN BED WATCHING TV, NO NEEDS NOTED, RESPIRATIONS EVEN/UNLABORED, FALL
PRECAUTIONS IN PLACE, FLUIDS/CALL LIGHT WITHIN REACH

## 2020-08-25 NOTE — NUR
PT RESTING WITH EYES CLOSED, RESP QUIET, NO DISTRESS NOTED, LEFT UNDISTURBED
AT THIS TIME, FALL PRECAUTIONS IN PLACED

## 2020-08-26 VITALS — DIASTOLIC BLOOD PRESSURE: 62 MMHG | SYSTOLIC BLOOD PRESSURE: 157 MMHG

## 2020-08-26 VITALS — SYSTOLIC BLOOD PRESSURE: 108 MMHG | DIASTOLIC BLOOD PRESSURE: 60 MMHG

## 2020-08-26 LAB
ANION GAP SERPL CALC-SCNC: 11 MMOL/L (ref 8–16)
BASOPHILS NFR BLD AUTO: 0.1 % (ref 0–2)
BUN SERPL-MCNC: 16 MG/DL (ref 7–18)
CALCIUM SERPL-MCNC: 8.9 MG/DL (ref 8.5–10.1)
CHLORIDE SERPL-SCNC: 104 MMOL/L (ref 98–107)
CO2 SERPL-SCNC: 27.8 MMOL/L (ref 21–32)
CREAT SERPL-MCNC: 0.9 MG/DL (ref 0.6–1.3)
EOSINOPHIL NFR BLD: 2.7 % (ref 0–7)
ERYTHROCYTE [DISTWIDTH] IN BLOOD BY AUTOMATED COUNT: 14.4 % (ref 11.5–14.5)
GLUCOSE SERPL-MCNC: 92 MG/DL (ref 74–106)
HCT VFR BLD CALC: 35.2 % (ref 36–48)
HGB BLD-MCNC: 10.7 G/DL (ref 12–16)
IMM GRANULOCYTES NFR BLD: 0.9 % (ref 0–5)
LYMPHOCYTES NFR BLD AUTO: 29.7 % (ref 15–50)
MCH RBC QN AUTO: 28 PG (ref 26–34)
MCHC RBC AUTO-ENTMCNC: 30.4 G/DL (ref 31–37)
MCV RBC: 92.1 FL (ref 80–100)
MONOCYTES NFR BLD: 6.5 % (ref 2–11)
NEUTROPHILS NFR BLD AUTO: 60.1 % (ref 40–80)
OSMOLALITY SERPL CALC.SUM OF ELEC: 276 MOSM/KG (ref 275–300)
PLATELET # BLD: 195 10X3/UL (ref 130–400)
PMV BLD AUTO: 9.2 FL (ref 7.4–10.4)
POTASSIUM SERPL-SCNC: 4.8 MMOL/L (ref 3.5–5.1)
RBC # BLD AUTO: 3.82 10X6/UL (ref 4–5.4)
SODIUM SERPL-SCNC: 138 MMOL/L (ref 136–145)
WBC # BLD AUTO: 8.8 10X3/UL (ref 4.8–10.8)

## 2020-08-26 NOTE — NUR
CARE TEAM MEETING:
PATIENT HAS DONE VERY WELL IN THERAPY AND WILL DC HOME IN THE AM 8/27/20. WILL
CONTINUE TO FOLLOW WITH PATIENT.

## 2020-08-27 VITALS — DIASTOLIC BLOOD PRESSURE: 52 MMHG | SYSTOLIC BLOOD PRESSURE: 150 MMHG

## 2020-08-27 NOTE — NUR
PATIENT D/C TO HOME VIA W/C WITH FAMILY AND ALL PERSONAL BELONGINGS. REVIEWED
MEDICATIONS, FOLLOW UP APPOINTMENTS AND HOME HEALTH CARE. [facility] [address]

## 2020-08-27 NOTE — NUR
PATIENT DISCHARGING TO HER HOME TODAY. [facility] WILL PROVIDE THERAPY
AT HOME.NO NEW DME NEEDED AT THIS TIME. PATIENT WILL MAKE AN APPOINTMENT WITH
DR. JACKSON FOR FOLLOW UP. DR. CAO 9/1/20 @ 9:00. [facility]URI SIGNED, IMM SERVED
AND EXPLAINED, ONE GIVEN TO PATIENT AND ONE FILED IN CHART. NO COMPARE DATA
REVIEWED PER PATIENT REQUEST. DC INSTRUCTIONS FAXED TO PCP, HOME HEALTH AND
REVIEWED WITH PATIENT PER PRIMARY NURSE.

## 2020-10-20 ENCOUNTER — HOSPITAL ENCOUNTER (OUTPATIENT)
Dept: HOSPITAL 84 - D.LAB | Age: 75
Discharge: HOME | End: 2020-10-20
Attending: INTERNAL MEDICINE
Payer: MEDICARE

## 2020-10-20 VITALS — BODY MASS INDEX: 35.9 KG/M2

## 2020-10-20 DIAGNOSIS — Z11.59: Primary | ICD-10-CM
